# Patient Record
Sex: MALE | Race: WHITE | Employment: OTHER | URBAN - METROPOLITAN AREA
[De-identification: names, ages, dates, MRNs, and addresses within clinical notes are randomized per-mention and may not be internally consistent; named-entity substitution may affect disease eponyms.]

---

## 2020-12-05 ENCOUNTER — APPOINTMENT (OUTPATIENT)
Dept: GENERAL RADIOLOGY | Age: 65
End: 2020-12-05
Payer: MEDICARE

## 2020-12-05 ENCOUNTER — HOSPITAL ENCOUNTER (EMERGENCY)
Age: 65
Discharge: HOME | End: 2020-12-05
Payer: MEDICARE

## 2020-12-05 VITALS
DIASTOLIC BLOOD PRESSURE: 70 MMHG | TEMPERATURE: 98.1 F | OXYGEN SATURATION: 97 % | HEART RATE: 82 BPM | WEIGHT: 180 LBS | SYSTOLIC BLOOD PRESSURE: 112 MMHG | RESPIRATION RATE: 18 BRPM

## 2020-12-05 LAB
A/G RATIO: 1.3 (ref 1.1–2.2)
ALBUMIN SERPL-MCNC: 4.2 G/DL (ref 3.4–5)
ALP BLD-CCNC: 71 U/L (ref 40–129)
ALT SERPL-CCNC: 8 U/L (ref 10–40)
ANION GAP SERPL CALCULATED.3IONS-SCNC: 11 MMOL/L (ref 3–16)
AST SERPL-CCNC: 13 U/L (ref 15–37)
BASOPHILS ABSOLUTE: 0 K/UL (ref 0–0.2)
BASOPHILS RELATIVE PERCENT: 0.3 %
BILIRUB SERPL-MCNC: 0.6 MG/DL (ref 0–1)
BUN BLDV-MCNC: 23 MG/DL (ref 7–20)
CALCIUM SERPL-MCNC: 9.6 MG/DL (ref 8.3–10.6)
CHLORIDE BLD-SCNC: 101 MMOL/L (ref 99–110)
CO2: 28 MMOL/L (ref 21–32)
CREAT SERPL-MCNC: 1.9 MG/DL (ref 0.8–1.3)
EOSINOPHILS ABSOLUTE: 0 K/UL (ref 0–0.6)
EOSINOPHILS RELATIVE PERCENT: 0 %
GFR AFRICAN AMERICAN: 43
GFR NON-AFRICAN AMERICAN: 36
GLOBULIN: 3.2 G/DL
GLUCOSE BLD-MCNC: 121 MG/DL (ref 70–99)
HCT VFR BLD CALC: 44.7 % (ref 40.5–52.5)
HEMOGLOBIN: 15 G/DL (ref 13.5–17.5)
LACTIC ACID: 1.1 MMOL/L (ref 0.4–2)
LYMPHOCYTES ABSOLUTE: 0.9 K/UL (ref 1–5.1)
LYMPHOCYTES RELATIVE PERCENT: 11.7 %
MCH RBC QN AUTO: 29.2 PG (ref 26–34)
MCHC RBC AUTO-ENTMCNC: 33.5 G/DL (ref 31–36)
MCV RBC AUTO: 87.1 FL (ref 80–100)
MONOCYTES ABSOLUTE: 1.2 K/UL (ref 0–1.3)
MONOCYTES RELATIVE PERCENT: 16.1 %
NEUTROPHILS ABSOLUTE: 5.5 K/UL (ref 1.7–7.7)
NEUTROPHILS RELATIVE PERCENT: 71.9 %
PDW BLD-RTO: 13.6 % (ref 12.4–15.4)
PLATELET # BLD: 203 K/UL (ref 135–450)
PMV BLD AUTO: 8.8 FL (ref 5–10.5)
POTASSIUM SERPL-SCNC: 4 MMOL/L (ref 3.5–5.1)
RBC # BLD: 5.13 M/UL (ref 4.2–5.9)
SARS-COV-2, NAAT: DETECTED
SODIUM BLD-SCNC: 140 MMOL/L (ref 136–145)
SPECIMEN STATUS: NORMAL
TOTAL PROTEIN: 7.4 G/DL (ref 6.4–8.2)
WBC # BLD: 7.6 K/UL (ref 4–11)

## 2020-12-05 PROCEDURE — 71045 X-RAY EXAM CHEST 1 VIEW: CPT

## 2020-12-05 PROCEDURE — 80053 COMPREHEN METABOLIC PANEL: CPT

## 2020-12-05 PROCEDURE — 99284 EMERGENCY DEPT VISIT MOD MDM: CPT

## 2020-12-05 PROCEDURE — 85025 COMPLETE CBC W/AUTO DIFF WBC: CPT

## 2020-12-05 PROCEDURE — U0002 COVID-19 LAB TEST NON-CDC: HCPCS

## 2020-12-05 PROCEDURE — 83605 ASSAY OF LACTIC ACID: CPT

## 2020-12-05 RX ORDER — FLUTICASONE PROPIONATE 50 MCG
1 SPRAY, SUSPENSION (ML) NASAL DAILY
COMMUNITY

## 2020-12-05 RX ORDER — AZITHROMYCIN 250 MG/1
250 TABLET, FILM COATED ORAL DAILY
Status: ON HOLD | COMMUNITY
End: 2020-12-08

## 2020-12-05 RX ORDER — GABAPENTIN 600 MG/1
600 TABLET ORAL 2 TIMES DAILY
COMMUNITY

## 2020-12-05 RX ORDER — PREDNISONE 20 MG/1
20 TABLET ORAL DAILY
Status: ON HOLD | COMMUNITY
End: 2020-12-08

## 2020-12-05 NOTE — ED NOTES
Pt ambulated with pulse ox with this RN. Pt O2 sat remains 93% on room air and heart rate 90's- low 100s. Pt denies lightheadedness or dizziness or any increasing SOB.       Karyle Minder, RN  12/05/20 6291

## 2020-12-05 NOTE — ED NOTES
Patient's visitor provided with copy of Emergency Department Visitor Restrictions. Instructed to review while waiting to visit with patient. Visitor verbalized understanding.      Mike Chris RN  12/05/20 0126

## 2020-12-05 NOTE — ED NOTES
--Patient provided with discharge instructions and any prescriptions. --Instructions, dosing, and follow-up appointments reviewed with patient/family. No further questions or needs at this time. --Vital signs and patient stable upon discharge. --Patient ambulatory to [facility].        Mukul Chang RN  12/05/20 0579

## 2020-12-06 NOTE — ED PROVIDER NOTES
[address] [place] [address]  ED  EMERGENCY DEPARTMENT ENCOUNTER      This patient was not seen and evaluated by the attending physician. Pt Name: Nissa Aguilar  MRN: [id number]  [place] 1955  Date of evaluation: 12/5/2020  Provider: DEE Jeffery - [facility]  PCP: Priscilla Edwards DO      History provided by the patient. CHIEFCOMPLAINT:     Chief Complaint   Patient presents with    Fever     104 last night, every one in the house has COVID +,     Shortness of Breath       HISTORY OF PRESENT ILLNESS:      Nissa Aguilar is a 72 y.o. male who presents to [address] [place] [address]  ED with complaints of fever shortness of breath. Patient states that he had intermittent shortness of breath, states that he had a fever last night, states that everybody that he lives with has tested positive for Covid. He denies any pain, states that he does not actually feel that bad but he did spike a fever and wants evaluated. Is here for further evaluation. LOCATION:-  QUALITY:-  SEVERITY:-  DURATION:-  MODIFYING FACTORS:-    Nursing Notes were reviewed     REVIEW OF SYSTEMS:     Review of Systems  All systems, a total of 10, are reviewed and negative except for those that were just noted in history present illness. PAST MEDICAL HISTORY:     Past Medical History:   Diagnosis Date    COPD (chronic obstructive pulmonary disease) ([facility] [address]          SURGICAL HISTORY:    History reviewed. No pertinent surgical history. CURRENT MEDICATIONS:       Discharge Medication List as of 12/5/2020  2:29 PM      CONTINUE these medications which have NOT CHANGED    Details   predniSONE (DELTASONE) 20 MG tablet Take 20 mg by mouth dailyHistorical Med      azithromycin (ZITHROMAX) 250 MG tablet Take 250 mg by mouth dailyHistorical Med      gabapentin (NEURONTIN) 600 MG tablet Take 600 mg by mouth 3 times daily. Historical Med      fluticasone (FLONASE) 50 MCG/ACT nasal spray 1 spray by Each Nostril route dailyHistorical Med               ALLERGIES:    Patient has no known allergies. FAMILY HISTORY:     History reviewed. No pertinent family history. SOCIAL HISTORY:     Social History     Socioeconomic History    Marital status:      Spouse name: None    Number of children: None    Years of education: None    Highest education level: None   Occupational History    None   Social Needs    Financial resource strain: None    Food insecurity     Worry: None     Inability: None    Transportation needs     Medical: None     Non-medical: None   Tobacco Use    Smoking status: Former Smoker   Substance and Sexual Activity    Alcohol use: Not Currently    Drug use: Never    Sexual activity: None   Lifestyle    Physical activity     Days per week: None     Minutes per session: None    Stress: None   Relationships    Social connections     Talks on phone: None     Gets together: None     Attends Restoration service: None     Active member of club or organization: None     Attends meetings of clubs or organizations: None     Relationship status: None    Intimate partner violence     Fear of current or ex partner: None     Emotionally abused: None     Physically abused: None     Forced sexual activity: None   Other Topics Concern    None   Social History Narrative    None       SCREENINGS:    [place] Coma Scale  Eye Opening: Spontaneous  Best Verbal Response: Oriented  Best Motor Response: Obeys commands  Magda Coma Scale Score: 15        PHYSICAL EXAM:       ED Triage Vitals   BP Temp Temp Source Pulse Resp SpO2 Height Weight   12/05/20 1237 12/05/20 1237 12/05/20 1237 12/05/20 1215 12/05/20 1237 12/05/20 1215 -- 12/05/20 1237   112/70 98.1 °F (36.7 °C) Oral 108 18 91 %  180 lb (81.6 kg)       Physical Exam    CONSTITUTIONAL: Awake and alert. Cooperative. Well-developed. Well-nourished.    Vitals:    12/05/20 1215 12/05/20 1237   BP:  112/70   Pulse: 108 82   Resp:  18   Temp:  98.1 °F (36.7 °C) TempSrc:  Oral   SpO2: 91% 97%   Weight:  180 lb (81.6 kg)     HENT: Normocephalic. Atraumatic. External ears normal, without discharge. TMs clear bilaterally. Nonasal discharge. Oropharynx clear, no erythema. Mucous membranes moist.  EYES: Conjunctiva non-injected, nolid abnormalities noted. No scleral icterus. PERRL. EOM's grossly intact. Anterior chambers clear. NECK: Supple. Normal ROM. No meningismus. No thyroid tenderness or swelling noted. CARDIOVASCULAR: RRR. No Murmer. No carotid bruits. PULMONARY/CHEST WALL: Effort normal. No tachypnea. Lungs clear to ausculation. ABDOMEN: Normal BS. Soft. Nondistended. No tenderness to palpation. No guarding. No hernias noted. No splenomegaly. Back: Spine is midline. No ecchymosis. No crepituson palpation. No obvious subluxation of vertebral column. No saddle anesthesia or evidence of cauda equina. /ANORECTAL: Not assessed  MUSKULOSKELETAL: Normal ROM. No acute deformities. No edema. No tenderness to palpate. SKIN: Warm and dry. NEUROLOGICAL:  GCS 15. CN II-XII grossly intact. Strength is 5/5 in all extremities and sensation is intact. PSYCHIATRIC: Normal affect, normal insight and judgement. Alert and oriented x 3.         DIAGNOSTIC RESULTS:     LABS:    Results for orders placed or performed during the hospital encounter of 12/05/20   CBC Auto Differential   Result Value Ref Range    WBC 7.6 4.0 - 11.0 K/uL    RBC 5.13 4.20 - 5.90 M/uL    Hemoglobin 15.0 13.5 - 17.5 g/dL    Hematocrit 44.7 40.5 - 52.5 %    MCV 87.1 80.0 - 100.0 fL    MCH 29.2 26.0 - 34.0 pg    MCHC 33.5 31.0 - 36.0 g/dL    RDW 13.6 12.4 - 15.4 %    Platelets 485 307 - 916 K/uL    MPV 8.8 5.0 - 10.5 fL    Neutrophils % 71.9 %    Lymphocytes % 11.7 %    Monocytes % 16.1 %    Eosinophils % 0.0 %    Basophils % 0.3 %    Neutrophils Absolute 5.5 1.7 - 7.7 K/uL    Lymphocytes Absolute 0.9 (L) 1.0 - 5.1 K/uL    Monocytes Absolute 1.2 0.0 - 1.3 K/uL    Eosinophils Absolute 0.0 0.0 - 0.6 K/uL Covid, rapid Covid then came back positive. No leukocytosis, stable renal dysfunction. Lactate was negative. Chest x-ray was equivocal with Covid. Patient was ambulated with no hypoxia, vital signs stable. He was discharged home in good condition. Patient verbalized understanding of plan to return for any worsening symptoms. Patient laboratory studies, radiographic imaging, and assessment were all discussed with the patient and/orpatient family. There was shared decision-making between myself as well as the patient and/or their surrogate and we are all in agreement with discharge home. There was an opportunity for questions and all questions were answered tothe best of my ability and to the satisfaction of the patient and/or patient family. FINAL IMPRESSION:      1.  COVID-19          DISPOSITION/PLAN:   DISPOSITION Decision To Discharge      PATIENT REFERRED TO:  DO Karely Franco [address] [place] [id number]  [phone]    Call   For follow up      DISCHARGE MEDICATIONS:  Discharge Medication List as of 12/5/2020  2:29 PM                     (Please note thatportions of this note were completed with a voice recognition program.  Efforts were made to edit the dictations, but occasionally words are mis-transcribed.)    DEE Taylor CNP-C (electronicallysigned)        DEE Taylor CNP  12/06/20 8832

## 2020-12-07 ENCOUNTER — CARE COORDINATION (OUTPATIENT)
Dept: CARE COORDINATION | Age: 65
End: 2020-12-07

## 2020-12-07 NOTE — CARE COORDINATION
Attempted outreach call for ED f/u and COVID-19 monitoring. Called the only # listed and the woman who answered stated that it was the wrong number. She states that's gotten a lot of calls \"over the years,\" but that no one lives there with that name. No future appointments. Debi Click MSN, RN  Ambulatory Care Manager  [phone]  [email or web address]

## 2020-12-08 ENCOUNTER — HOSPITAL ENCOUNTER (INPATIENT)
Age: 65
LOS: 4 days | Discharge: HOME | DRG: 177 | End: 2020-12-12
Attending: EMERGENCY MEDICINE | Admitting: INTERNAL MEDICINE
Payer: MEDICARE

## 2020-12-08 ENCOUNTER — APPOINTMENT (OUTPATIENT)
Dept: GENERAL RADIOLOGY | Age: 65
DRG: 177 | End: 2020-12-08
Payer: MEDICARE

## 2020-12-08 PROBLEM — J96.01 ACUTE RESPIRATORY FAILURE WITH HYPOXIA (HCC): Status: ACTIVE | Noted: 2020-12-08

## 2020-12-08 LAB
A/G RATIO: 1.1 (ref 1.1–2.2)
A/G RATIO: 1.1 (ref 1.1–2.2)
ALBUMIN SERPL-MCNC: 3.4 G/DL (ref 3.4–5)
ALBUMIN SERPL-MCNC: 3.7 G/DL (ref 3.4–5)
ALP BLD-CCNC: 58 U/L (ref 40–129)
ALP BLD-CCNC: 60 U/L (ref 40–129)
ALT SERPL-CCNC: 8 U/L (ref 10–40)
ALT SERPL-CCNC: 8 U/L (ref 10–40)
ANION GAP SERPL CALCULATED.3IONS-SCNC: 12 MMOL/L (ref 3–16)
ANION GAP SERPL CALCULATED.3IONS-SCNC: 12 MMOL/L (ref 3–16)
AST SERPL-CCNC: 16 U/L (ref 15–37)
AST SERPL-CCNC: 16 U/L (ref 15–37)
BASE EXCESS VENOUS: -4.4 MMOL/L (ref -3–3)
BASOPHILS ABSOLUTE: 0 K/UL (ref 0–0.2)
BASOPHILS ABSOLUTE: 0 K/UL (ref 0–0.2)
BASOPHILS RELATIVE PERCENT: 0.1 %
BASOPHILS RELATIVE PERCENT: 0.5 %
BILIRUB SERPL-MCNC: 0.3 MG/DL (ref 0–1)
BILIRUB SERPL-MCNC: 0.4 MG/DL (ref 0–1)
BUN BLDV-MCNC: 22 MG/DL (ref 7–20)
BUN BLDV-MCNC: 24 MG/DL (ref 7–20)
CALCIUM SERPL-MCNC: 8.1 MG/DL (ref 8.3–10.6)
CALCIUM SERPL-MCNC: 8.8 MG/DL (ref 8.3–10.6)
CARBOXYHEMOGLOBIN: 2.2 % (ref 0–1.5)
CHLORIDE BLD-SCNC: 100 MMOL/L (ref 99–110)
CHLORIDE BLD-SCNC: 101 MMOL/L (ref 99–110)
CO2: 23 MMOL/L (ref 21–32)
CO2: 24 MMOL/L (ref 21–32)
CREAT SERPL-MCNC: 1.3 MG/DL (ref 0.8–1.3)
CREAT SERPL-MCNC: 1.4 MG/DL (ref 0.8–1.3)
D DIMER: 244 NG/ML DDU (ref 0–229)
EOSINOPHILS ABSOLUTE: 0 K/UL (ref 0–0.6)
EOSINOPHILS ABSOLUTE: 0 K/UL (ref 0–0.6)
EOSINOPHILS RELATIVE PERCENT: 0 %
EOSINOPHILS RELATIVE PERCENT: 0.1 %
GFR AFRICAN AMERICAN: >60
GFR AFRICAN AMERICAN: >60
GFR NON-AFRICAN AMERICAN: 51
GFR NON-AFRICAN AMERICAN: 55
GLOBULIN: 3 G/DL
GLOBULIN: 3.4 G/DL
GLUCOSE BLD-MCNC: 179 MG/DL (ref 70–99)
GLUCOSE BLD-MCNC: 94 MG/DL (ref 70–99)
HCO3 VENOUS: 21.9 MMOL/L (ref 23–29)
HCT VFR BLD CALC: 39.6 % (ref 40.5–52.5)
HCT VFR BLD CALC: 42.7 % (ref 40.5–52.5)
HEMOGLOBIN: 13.4 G/DL (ref 13.5–17.5)
HEMOGLOBIN: 14.3 G/DL (ref 13.5–17.5)
LACTIC ACID: 1.3 MMOL/L (ref 0.4–2)
LYMPHOCYTES ABSOLUTE: 0.2 K/UL (ref 1–5.1)
LYMPHOCYTES ABSOLUTE: 0.6 K/UL (ref 1–5.1)
LYMPHOCYTES RELATIVE PERCENT: 13.3 %
LYMPHOCYTES RELATIVE PERCENT: 5.3 %
MAGNESIUM: 2.2 MG/DL (ref 1.8–2.4)
MCH RBC QN AUTO: 29.2 PG (ref 26–34)
MCH RBC QN AUTO: 29.5 PG (ref 26–34)
MCHC RBC AUTO-ENTMCNC: 33.5 G/DL (ref 31–36)
MCHC RBC AUTO-ENTMCNC: 33.8 G/DL (ref 31–36)
MCV RBC AUTO: 87.1 FL (ref 80–100)
MCV RBC AUTO: 87.3 FL (ref 80–100)
METHEMOGLOBIN VENOUS: 0.3 %
MONOCYTES ABSOLUTE: 0.1 K/UL (ref 0–1.3)
MONOCYTES ABSOLUTE: 0.5 K/UL (ref 0–1.3)
MONOCYTES RELATIVE PERCENT: 10.4 %
MONOCYTES RELATIVE PERCENT: 2.6 %
NEUTROPHILS ABSOLUTE: 3.5 K/UL (ref 1.7–7.7)
NEUTROPHILS ABSOLUTE: 3.5 K/UL (ref 1.7–7.7)
NEUTROPHILS RELATIVE PERCENT: 75.7 %
NEUTROPHILS RELATIVE PERCENT: 92 %
O2 CONTENT, VEN: 8 VOL %
O2 SAT, VEN: 34 %
O2 THERAPY: ABNORMAL
PCO2, VEN: 45 MMHG (ref 40–50)
PDW BLD-RTO: 13.7 % (ref 12.4–15.4)
PDW BLD-RTO: 13.7 % (ref 12.4–15.4)
PH VENOUS: 7.31 (ref 7.35–7.45)
PLATELET # BLD: 156 K/UL (ref 135–450)
PLATELET # BLD: 161 K/UL (ref 135–450)
PMV BLD AUTO: 8.7 FL (ref 5–10.5)
PMV BLD AUTO: 9 FL (ref 5–10.5)
PO2, VEN: 22.6 MMHG (ref 25–40)
POTASSIUM REFLEX MAGNESIUM: 4.3 MMOL/L (ref 3.5–5.1)
POTASSIUM SERPL-SCNC: 3.8 MMOL/L (ref 3.5–5.1)
PRO-BNP: 10 PG/ML (ref 0–124)
PROCALCITONIN: 0.4 NG/ML (ref 0–0.15)
PROCALCITONIN: 0.55 NG/ML (ref 0–0.15)
RBC # BLD: 4.54 M/UL (ref 4.2–5.9)
RBC # BLD: 4.9 M/UL (ref 4.2–5.9)
SODIUM BLD-SCNC: 135 MMOL/L (ref 136–145)
SODIUM BLD-SCNC: 137 MMOL/L (ref 136–145)
TCO2 CALC VENOUS: 23 MMOL/L
TOTAL PROTEIN: 6.4 G/DL (ref 6.4–8.2)
TOTAL PROTEIN: 7.1 G/DL (ref 6.4–8.2)
TROPONIN: <0.01 NG/ML
WBC # BLD: 3.8 K/UL (ref 4–11)
WBC # BLD: 4.6 K/UL (ref 4–11)

## 2020-12-08 PROCEDURE — 6360000002 HC RX W HCPCS: Performed by: PHYSICIAN ASSISTANT

## 2020-12-08 PROCEDURE — 83880 ASSAY OF NATRIURETIC PEPTIDE: CPT

## 2020-12-08 PROCEDURE — 83735 ASSAY OF MAGNESIUM: CPT

## 2020-12-08 PROCEDURE — 2700000000 HC OXYGEN THERAPY PER DAY

## 2020-12-08 PROCEDURE — 84484 ASSAY OF TROPONIN QUANT: CPT

## 2020-12-08 PROCEDURE — 85379 FIBRIN DEGRADATION QUANT: CPT

## 2020-12-08 PROCEDURE — 6370000000 HC RX 637 (ALT 250 FOR IP): Performed by: INTERNAL MEDICINE

## 2020-12-08 PROCEDURE — 84145 PROCALCITONIN (PCT): CPT

## 2020-12-08 PROCEDURE — 99285 EMERGENCY DEPT VISIT HI MDM: CPT

## 2020-12-08 PROCEDURE — 2580000003 HC RX 258: Performed by: PHYSICIAN ASSISTANT

## 2020-12-08 PROCEDURE — 85025 COMPLETE CBC W/AUTO DIFF WBC: CPT

## 2020-12-08 PROCEDURE — 87040 BLOOD CULTURE FOR BACTERIA: CPT

## 2020-12-08 PROCEDURE — 83605 ASSAY OF LACTIC ACID: CPT

## 2020-12-08 PROCEDURE — 2500000003 HC RX 250 WO HCPCS: Performed by: INTERNAL MEDICINE

## 2020-12-08 PROCEDURE — 36415 COLL VENOUS BLD VENIPUNCTURE: CPT

## 2020-12-08 PROCEDURE — 1200000000 HC SEMI PRIVATE

## 2020-12-08 PROCEDURE — 86900 BLOOD TYPING SEROLOGIC ABO: CPT

## 2020-12-08 PROCEDURE — 2580000003 HC RX 258: Performed by: INTERNAL MEDICINE

## 2020-12-08 PROCEDURE — XW033E5 INTRODUCTION OF REMDESIVIR ANTI-INFECTIVE INTO PERIPHERAL VEIN, PERCUTANEOUS APPROACH, NEW TECHNOLOGY GROUP 5: ICD-10-PCS | Performed by: INTERNAL MEDICINE

## 2020-12-08 PROCEDURE — 71045 X-RAY EXAM CHEST 1 VIEW: CPT

## 2020-12-08 PROCEDURE — 80053 COMPREHEN METABOLIC PANEL: CPT

## 2020-12-08 PROCEDURE — 93005 ELECTROCARDIOGRAM TRACING: CPT | Performed by: EMERGENCY MEDICINE

## 2020-12-08 PROCEDURE — 6370000000 HC RX 637 (ALT 250 FOR IP): Performed by: EMERGENCY MEDICINE

## 2020-12-08 PROCEDURE — 94761 N-INVAS EAR/PLS OXIMETRY MLT: CPT

## 2020-12-08 PROCEDURE — 82803 BLOOD GASES ANY COMBINATION: CPT

## 2020-12-08 PROCEDURE — 96374 THER/PROPH/DIAG INJ IV PUSH: CPT

## 2020-12-08 PROCEDURE — 99223 1ST HOSP IP/OBS HIGH 75: CPT | Performed by: INTERNAL MEDICINE

## 2020-12-08 PROCEDURE — 6360000002 HC RX W HCPCS: Performed by: EMERGENCY MEDICINE

## 2020-12-08 PROCEDURE — 86850 RBC ANTIBODY SCREEN: CPT

## 2020-12-08 PROCEDURE — 86901 BLOOD TYPING SEROLOGIC RH(D): CPT

## 2020-12-08 RX ORDER — ONDANSETRON 2 MG/ML
4 INJECTION INTRAMUSCULAR; INTRAVENOUS EVERY 6 HOURS PRN
Status: DISCONTINUED | OUTPATIENT
Start: 2020-12-08 | End: 2020-12-12 | Stop reason: HOSPADM

## 2020-12-08 RX ORDER — MONTELUKAST SODIUM 10 MG/1
10 TABLET ORAL NIGHTLY
Status: DISCONTINUED | OUTPATIENT
Start: 2020-12-08 | End: 2020-12-12 | Stop reason: HOSPADM

## 2020-12-08 RX ORDER — ALBUTEROL SULFATE 90 UG/1
2 AEROSOL, METERED RESPIRATORY (INHALATION) 4 TIMES DAILY
Status: DISCONTINUED | OUTPATIENT
Start: 2020-12-08 | End: 2020-12-08

## 2020-12-08 RX ORDER — ACETAMINOPHEN 325 MG/1
650 TABLET ORAL EVERY 6 HOURS PRN
Status: DISCONTINUED | OUTPATIENT
Start: 2020-12-08 | End: 2020-12-12 | Stop reason: HOSPADM

## 2020-12-08 RX ORDER — POTASSIUM CHLORIDE 20 MEQ/1
40 TABLET, EXTENDED RELEASE ORAL PRN
Status: DISCONTINUED | OUTPATIENT
Start: 2020-12-08 | End: 2020-12-12 | Stop reason: HOSPADM

## 2020-12-08 RX ORDER — SODIUM CHLORIDE 0.9 % (FLUSH) 0.9 %
10 SYRINGE (ML) INJECTION EVERY 12 HOURS SCHEDULED
Status: DISCONTINUED | OUTPATIENT
Start: 2020-12-08 | End: 2020-12-12 | Stop reason: HOSPADM

## 2020-12-08 RX ORDER — TAMSULOSIN HYDROCHLORIDE 0.4 MG/1
0.4 CAPSULE ORAL DAILY
COMMUNITY

## 2020-12-08 RX ORDER — PROMETHAZINE HYDROCHLORIDE 25 MG/1
12.5 TABLET ORAL EVERY 6 HOURS PRN
Status: DISCONTINUED | OUTPATIENT
Start: 2020-12-08 | End: 2020-12-12 | Stop reason: HOSPADM

## 2020-12-08 RX ORDER — 0.9 % SODIUM CHLORIDE 0.9 %
20 INTRAVENOUS SOLUTION INTRAVENOUS ONCE
Status: DISCONTINUED | OUTPATIENT
Start: 2020-12-08 | End: 2020-12-12 | Stop reason: HOSPADM

## 2020-12-08 RX ORDER — MONTELUKAST SODIUM 10 MG/1
10 TABLET ORAL NIGHTLY
COMMUNITY

## 2020-12-08 RX ORDER — ACETAMINOPHEN 650 MG/1
650 SUPPOSITORY RECTAL EVERY 6 HOURS PRN
Status: DISCONTINUED | OUTPATIENT
Start: 2020-12-08 | End: 2020-12-12 | Stop reason: HOSPADM

## 2020-12-08 RX ORDER — MAGNESIUM SULFATE 1 G/100ML
1 INJECTION INTRAVENOUS PRN
Status: DISCONTINUED | OUTPATIENT
Start: 2020-12-08 | End: 2020-12-12 | Stop reason: HOSPADM

## 2020-12-08 RX ORDER — POLYETHYLENE GLYCOL 3350 17 G/17G
17 POWDER, FOR SOLUTION ORAL DAILY PRN
Status: DISCONTINUED | OUTPATIENT
Start: 2020-12-08 | End: 2020-12-12 | Stop reason: HOSPADM

## 2020-12-08 RX ORDER — SODIUM CHLORIDE 0.9 % (FLUSH) 0.9 %
10 SYRINGE (ML) INJECTION PRN
Status: DISCONTINUED | OUTPATIENT
Start: 2020-12-08 | End: 2020-12-12 | Stop reason: HOSPADM

## 2020-12-08 RX ORDER — GABAPENTIN 300 MG/1
600 CAPSULE ORAL 2 TIMES DAILY
Status: DISCONTINUED | OUTPATIENT
Start: 2020-12-08 | End: 2020-12-12 | Stop reason: HOSPADM

## 2020-12-08 RX ORDER — FLUTICASONE PROPIONATE 50 MCG
1 SPRAY, SUSPENSION (ML) NASAL DAILY
Status: DISCONTINUED | OUTPATIENT
Start: 2020-12-09 | End: 2020-12-12 | Stop reason: HOSPADM

## 2020-12-08 RX ORDER — DEXAMETHASONE SODIUM PHOSPHATE 10 MG/ML
10 INJECTION, SOLUTION INTRAMUSCULAR; INTRAVENOUS ONCE
Status: COMPLETED | OUTPATIENT
Start: 2020-12-08 | End: 2020-12-08

## 2020-12-08 RX ORDER — ALBUTEROL SULFATE 90 UG/1
2 AEROSOL, METERED RESPIRATORY (INHALATION) EVERY 4 HOURS PRN
Status: DISCONTINUED | OUTPATIENT
Start: 2020-12-08 | End: 2020-12-12 | Stop reason: HOSPADM

## 2020-12-08 RX ORDER — IPRATROPIUM BROMIDE AND ALBUTEROL SULFATE 2.5; .5 MG/3ML; MG/3ML
1 SOLUTION RESPIRATORY (INHALATION) ONCE
Status: COMPLETED | OUTPATIENT
Start: 2020-12-08 | End: 2020-12-08

## 2020-12-08 RX ORDER — TAMSULOSIN HYDROCHLORIDE 0.4 MG/1
0.4 CAPSULE ORAL DAILY
Status: DISCONTINUED | OUTPATIENT
Start: 2020-12-08 | End: 2020-12-12 | Stop reason: HOSPADM

## 2020-12-08 RX ORDER — POTASSIUM CHLORIDE 7.45 MG/ML
10 INJECTION INTRAVENOUS PRN
Status: DISCONTINUED | OUTPATIENT
Start: 2020-12-08 | End: 2020-12-12 | Stop reason: HOSPADM

## 2020-12-08 RX ORDER — 0.9 % SODIUM CHLORIDE 0.9 %
30 INTRAVENOUS SOLUTION INTRAVENOUS PRN
Status: DISCONTINUED | OUTPATIENT
Start: 2020-12-08 | End: 2020-12-12 | Stop reason: HOSPADM

## 2020-12-08 RX ADMIN — DEXAMETHASONE SODIUM PHOSPHATE 10 MG: 10 INJECTION, SOLUTION INTRAMUSCULAR; INTRAVENOUS at 14:00

## 2020-12-08 RX ADMIN — REMDESIVIR 200 MG: 100 INJECTION, POWDER, LYOPHILIZED, FOR SOLUTION INTRAVENOUS at 21:51

## 2020-12-08 RX ADMIN — GABAPENTIN 600 MG: 300 CAPSULE ORAL at 20:27

## 2020-12-08 RX ADMIN — TAMSULOSIN HYDROCHLORIDE 0.4 MG: 0.4 CAPSULE ORAL at 20:27

## 2020-12-08 RX ADMIN — MONTELUKAST SODIUM 10 MG: 10 TABLET, COATED ORAL at 20:27

## 2020-12-08 RX ADMIN — IPRATROPIUM BROMIDE AND ALBUTEROL SULFATE 1 AMPULE: .5; 3 SOLUTION RESPIRATORY (INHALATION) at 13:59

## 2020-12-08 RX ADMIN — Medication 10 ML: at 20:28

## 2020-12-08 RX ADMIN — SODIUM CHLORIDE 30 ML: 9 INJECTION, SOLUTION INTRAVENOUS at 21:51

## 2020-12-08 RX ADMIN — ENOXAPARIN SODIUM 30 MG: 100 INJECTION SUBCUTANEOUS at 20:27

## 2020-12-08 ASSESSMENT — ENCOUNTER SYMPTOMS
BACK PAIN: 0
COUGH: 1
DIARRHEA: 0
VOMITING: 0
NAUSEA: 0
CHEST TIGHTNESS: 1
ABDOMINAL PAIN: 0
SHORTNESS OF BREATH: 1
COLOR CHANGE: 0

## 2020-12-08 ASSESSMENT — PAIN DESCRIPTION - DESCRIPTORS: DESCRIPTORS: ACHING

## 2020-12-08 ASSESSMENT — PAIN SCALES - GENERAL: PAINLEVEL_OUTOF10: 4

## 2020-12-08 ASSESSMENT — PAIN DESCRIPTION - LOCATION: LOCATION: HEAD

## 2020-12-08 NOTE — ED PROVIDER NOTES
EMERGENCY DEPARTMENT ENCOUNTER        Pt Name: Magalys Dozier  MRN: [id number]  [place] 1955  Date of evaluation: 12/8/2020  Provider: Florinda Zaldivar MD  PCP: Billy Whitlock DO      CHIEF COMPLAINT       Chief Complaint   Patient presents with    Concern For COVID-19    Shortness of Breath     copd no home 02 desat to 84 ambulating       HISTORY OFPRESENT ILLNESS   (Location/Symptom, Timing/Onset, Context/Setting, Quality, Duration, Modifying Factors,Severity)  Note limiting factors. Magalys Dozier is a 72 y.o. male presenting today due to concern for increasing shortness of breath with known Covid infection. Symptoms started 8 days ago. He denies any chest pain or pain with breathing. No leg swelling or history of blood clots. He has a mild headache. No unilateral numbness or weakness but he does feel weak all over. He does complain of fevers and chills. No abdominal pain or vomiting or diarrhea. He does not normally wear oxygen but was saturating at 84% on room air when he ambulated to the room. He does have a history of COPD but quit smoking in 2008. His main concern was the worsening shortness of breath so he came to the emergency department for further evaluation. He denies any falls or trauma. No syncope. REVIEW OF SYSTEMS    (2-9 systems for level 4, 10 or more for level 5)     Review of Systems   Constitutional: Positive for chills, fatigue and fever. HENT: Positive for congestion. Respiratory: Positive for cough, chest tightness and shortness of breath. Cardiovascular: Negative for chest pain. Gastrointestinal: Negative for abdominal pain, diarrhea, nausea and vomiting. Genitourinary: Negative for flank pain. Musculoskeletal: Negative for back pain, gait problem and neck pain. Skin: Negative for color change and wound. Neurological: Positive for weakness (generalized) and headaches (very mild).  Negative for dizziness, syncope and light-headedness. Psychiatric/Behavioral: Negative for confusion. Positives and Pertinent negatives as per HPI. PASTMEDICAL HISTORY     Past Medical History:   Diagnosis Date    COPD (chronic obstructive pulmonary disease) ([address]          SURGICAL HISTORY     History reviewed. No pertinent surgical history. CURRENT MEDICATIONS       Current Discharge Medication List      CONTINUE these medications which have NOT CHANGED    Details   montelukast (SINGULAIR) 10 MG tablet Take 10 mg by mouth nightly      tamsulosin (FLOMAX) 0.4 MG capsule Take 0.4 mg by mouth daily      gabapentin (NEURONTIN) 600 MG tablet Take 600 mg by mouth 2 times daily. fluticasone (FLONASE) 50 MCG/ACT nasal spray 1 spray by Each Nostril route daily             ALLERGIES     Patient has no known allergies. FAMILY HISTORY     History reviewed. No pertinent family history.        SOCIAL HISTORY       Social History     Socioeconomic History    Marital status:      Spouse name: None    Number of children: None    Years of education: None    Highest education level: None   Occupational History    None   Social Needs    Financial resource strain: None    Food insecurity     Worry: None     Inability: None    Transportation needs     Medical: None     Non-medical: None   Tobacco Use    Smoking status: Former Smoker     Packs/day: 2.00     Years: 30.00     Pack years: 60.00     Types: Cigarettes     Last attempt to quit: 2008     Years since quittin.9    Smokeless tobacco: Never Used   Substance and Sexual Activity    Alcohol use: Not Currently    Drug use: Never    Sexual activity: None   Lifestyle    Physical activity     Days per week: None     Minutes per session: None    Stress: None   Relationships    Social connections     Talks on phone: None     Gets together: None     Attends [employer] service: None     Active member of club or organization: None     Attends meetings of clubs or organizations: None     Relationship status: None    Intimate partner violence     Fear of current or ex partner: None     Emotionally abused: None     Physically abused: None     Forced sexual activity: None   Other Topics Concern    None   Social History Narrative    None       SCREENINGS    [place] Coma Scale  Eye Opening: Spontaneous  Best Verbal Response: Oriented  Best Motor Response: Obeys commands  Magda Coma Scale Score: 15           PHYSICAL EXAM    (up to 7 for level 4, 8 or more for level 5)     ED Triage Vitals   BP Temp Temp src Pulse Resp SpO2 Height Weight   -- -- -- -- -- -- -- --       Physical Exam  Vitals signs and nursing note reviewed. Constitutional:       General: He is awake. He is not in acute distress. Appearance: Normal appearance. He is well-developed, well-groomed and normal weight. He is not ill-appearing, toxic-appearing or diaphoretic. Interventions: He is not intubated. HENT:      Head: Normocephalic and atraumatic. Right Ear: External ear normal.      Left Ear: External ear normal.   Eyes:      General:         Right eye: No discharge. Left eye: No discharge. Neck:      Musculoskeletal: Full passive range of motion without pain, normal range of motion and neck supple. Normal range of motion. No edema, erythema, neck rigidity, crepitus, injury, pain with movement, torticollis, spinous process tenderness or muscular tenderness. Trachea: Trachea and phonation normal. No tracheal deviation. Cardiovascular:      Rate and Rhythm: Normal rate and regular rhythm. Pulses: Normal pulses. Pulmonary:      Effort: Pulmonary effort is normal. No tachypnea, bradypnea, accessory muscle usage, prolonged expiration, respiratory distress or retractions. He is not intubated. Breath sounds: Normal breath sounds and air entry. No stridor, decreased air movement or transmitted upper airway sounds. No decreased breath sounds, wheezing, rhonchi or rales. Chest:      Chest wall: No tenderness. Abdominal:      General: Abdomen is flat. Bowel sounds are normal. There is no distension. Palpations: Abdomen is soft. Tenderness: There is no abdominal tenderness. There is no guarding or rebound. Negative signs include Weston's sign and McBurney's sign. Musculoskeletal: Normal range of motion. General: No swelling, tenderness, deformity or signs of injury. Right lower leg: No edema. Left lower leg: No edema. Skin:     General: Skin is warm and dry. Coloration: Skin is not jaundiced or pale. Findings: No bruising, erythema, lesion or rash. Neurological:      General: No focal deficit present. Mental Status: He is alert and oriented to person, place, and time. Mental status is at baseline. GCS: GCS eye subscore is 4. GCS verbal subscore is 5. GCS motor subscore is 6. Sensory: Sensation is intact. No sensory deficit. Motor: Motor function is intact. No weakness, tremor, atrophy, abnormal muscle tone or seizure activity. Gait: Gait is intact. Gait normal.   Psychiatric:         Attention and Perception: Attention normal.         Mood and Affect: Mood and affect normal.         Speech: Speech normal. Speech is not slurred. Behavior: Behavior normal. Behavior is cooperative. Cognition and Memory: Cognition normal.             DIAGNOSTIC RESULTS   :    Labs Reviewed   CBC WITH AUTO DIFFERENTIAL - Abnormal; Notable for the following components:       Result Value    Lymphocytes Absolute 0.6 (*)     All other components within normal limits    Narrative:     Performed at:  [facility]. [facility]  [address] [address].  [facility], [address] [place] [address]   Phone [phone]   COMPREHENSIVE METABOLIC PANEL - Abnormal; Notable for the following components:    BUN 24 (*)     CREATININE 1.4 (*)     GFR Non- 51 (*)     ALT 8 (*)     All other components within normal limits    Narrative:     Performed at:  [facility]. [facility]  [address] [place] [address]. [facility] Tagged    Phone [phone]   D-DIMER, QUANTITATIVE - Abnormal; Notable for the following components:    D-Dimer, Quant 244 (*)     All other components within normal limits    Narrative:     Performed at:  [facility]. [facility]  [address] [place], [place] [address]. [facility] Tagged    Phone [phone]   BLOOD GAS, VENOUS - Abnormal; Notable for the following components:    pH, Harvey 7.306 (*)     pO2, Harvey 22.6 (*)     HCO3, Venous 21.9 (*)     Base Excess, Harvey -4.4 (*)     Carboxyhemoglobin 2.2 (*)     All other components within normal limits    Narrative:     Performed at:  [facility]. [facility]  [address] [place] [address].  [facility] Tagged    Phone [phone]   COMPREHENSIVE METABOLIC PANEL W/ REFLEX TO MG FOR LOW K - Abnormal; Notable for the following components:    Sodium 135 (*)     Glucose 179 (*)     BUN 22 (*)     GFR Non-African American 55 (*)     Calcium 8.1 (*)     ALT 8 (*)     All other components within normal limits    Narrative:     Performed at:  [facility],  [place], [employer]   Phone [phone]   CBC WITH AUTO DIFFERENTIAL - Abnormal; Notable for the following components:    WBC 3.8 (*)     Hemoglobin 13.4 (*)     Hematocrit 39.6 (*)     Lymphocytes Absolute 0.2 (*)     All other components within normal limits    Narrative:     Performed at:  [facility]) - [facility],  [place], [employer]   Phone [phone]   PROCALCITONIN - Abnormal; Notable for the following components:    Procalcitonin 0.40 (*)     All other components within normal limits    Narrative:     Performed at:  [facility],  [place], [employer]   Phone [phone]   CULTURE, BLOOD 1 CULTURE, BLOOD 2   TROPONIN    Narrative:     Performed at:  [facility]. [facility]  [address] [place] [address],  [facility] [address] [place], [facility]    Phone [phone]   LACTIC ACID, PLASMA    Narrative:     Performed at:  [facility]. [facility]  [facility],  [facility] [address] [place], [facility] [address]   Phone [phone]    Narrative:     Performed at:  [facility]. [facility]  [address] [place] [address],  [facility] [address] [place] [facility]    Phone [phone]   MAGNESIUM    Narrative:     Performed at:  [facility]. [facility]  [address] [place] [address],  [facility] [address] [place] [address]   Phone [phone]   PROCALCITONIN   COMPREHENSIVE METABOLIC PANEL W/ REFLEX TO MG FOR LOW K   CBC WITH AUTO DIFFERENTIAL   PREPARE COVID-19 CONVALESCENT PLASMA   TYPE AND SCREEN     D-dimer was obtained for Covid evaluation and not due to concern for pulmonary embolism. All other labs were within normal range or not returned asof this dictation. EKG: All EKG's are interpreted by the Emergency Department Physician who either signs or Co-signs this chart in the absence of a cardiologist.    The Ekg interpreted by me shows  normal sinus rhythm with a rate of 87  Axis is   Normal  QTc is  normal  Intervals and Durations are unremarkable. ST Segments: nonspecific changes  No significant change from prior EKG dated - no old EKG  No STEMI, some baseline artifact noted but no obvious concerning findings at this time           RADIOLOGY:   Non-plain film images such as CT, Ultrasound and MRI are read by the radiologist. Malen Rocker images are visualized and preliminarily interpreted by the  ED Provider with the belowfindings:        Interpretation per the Radiologist below, if available at the time of this note:    XR CHEST PORTABLE   Final Result   Increasing bibasilar pneumonia.                PROCEDURES   Unless otherwise noted below, none     Procedures    CRITICAL CARE TIME   Time: 35 minutes  Includes examination, speaking with consultants, lab interpretation, charting, treating for acute respiratory failure with new oxygen requirement  Excludes separate billable procedures. Patient at risk for serious decompensation if not treated for this life-threatening condition. CONSULTS: Spoke with nurse practitioner Melvern Essex at [phone] for admission at [facility].   IP CONSULT TO HOSPITALIST  IP CONSULT TO PULMONOLOGY  IP CONSULT TO PHARMACY    EMERGENCY DEPARTMENT COURSE and DIFFERENTIAL DIAGNOSIS/MDM:   Vitals:    Vitals:    12/08/20 1738 12/08/20 1748 12/08/20 1931 12/08/20 2000   BP: 133/79  (!) 141/78    Pulse: 80  97    Resp: 19  18 16   Temp: 98.6 °F (37 °C)  99.5 °F (37.5 °C)    TempSrc: Axillary  Oral    SpO2:  94% 96% 95%   Weight:       Height:           Patient was given the following medications:  Medications   fluticasone (FLONASE) 50 MCG/ACT nasal spray 1 spray (has no administration in time range)   acetaminophen (TYLENOL) tablet 650 mg (has no administration in time range)     Or   acetaminophen (TYLENOL) suppository 650 mg (has no administration in time range)   enoxaparin (LOVENOX) injection 30 mg (30 mg Subcutaneous Given 12/8/20 2027)   sodium chloride flush 0.9 % injection 10 mL (10 mLs Intravenous Given 12/8/20 2028)   sodium chloride flush 0.9 % injection 10 mL (has no administration in time range)   potassium chloride (KLOR-CON M) extended release tablet 40 mEq (has no administration in time range)     Or   potassium bicarb-citric acid (EFFER-K) effervescent tablet 40 mEq (has no administration in time range)     Or   potassium chloride 10 mEq/100 mL IVPB (Peripheral Line) (has no administration in time range)   magnesium sulfate 1 g in dextrose 5% 100 mL IVPB (has no administration in time range)   polyethylene glycol (GLYCOLAX) packet 17 g (has no administration in time range)   promethazine (PHENERGAN) tablet 12.5 mg (has no administration in time range)     Or   ondansetron (ZOFRAN) injection 4 mg (has no administration in time range)   dexamethasone (DECADRON) tablet 6 mg (has no administration in time range)   gabapentin (NEURONTIN) capsule 600 mg (600 mg Oral Given 12/8/20 2027)   montelukast (SINGULAIR) tablet 10 mg (10 mg Oral Given 12/8/20 2027)   tamsulosin (FLOMAX) capsule 0.4 mg (0.4 mg Oral Given 12/8/20 2027)   0.9 % sodium chloride bolus (has no administration in time range)   remdesivir 200 mg in sodium chloride 0.9 % 250 mL IVPB (has no administration in time range)     Followed by   remdesivir 100 mg in sodium chloride 0.9 % 250 mL IVPB (has no administration in time range)   0.9 % sodium chloride bolus (has no administration in time range)   albuterol sulfate  (90 Base) MCG/ACT inhaler 2 puff (has no administration in time range)   ipratropium-albuterol (DUONEB) nebulizer solution 1 ampule (1 ampule Inhalation Given 12/8/20 1359)   dexamethasone (PF) (DECADRON) injection 10 mg (10 mg Intravenous Given 12/8/20 1400)     Patient was evaluated due to concern for increasing shortness of breath over the last 8 days with known Covid infection from testing 3 days ago at [facility]. He does not normally wear oxygen and does have a new oxygen requirement. He is comfortable in the room and in no acute distress when I saw him. He denies any pain with breathing and at this time I have low suspicion for pulmonary embolism. He will need further evaluation in the hospital due to concern for acute respiratory failure with hypoxia. He is stable for the floor with telemetry. Troponin was negative and story not suggestive of acute coronary syndrome. The patient tolerated their visit well. The patient and / or the family were informed of the results of any tests, a time was given to answer questions. FINAL IMPRESSION      1. Acute respiratory failure with hypoxia ([address]    2.

## 2020-12-08 NOTE — PROGRESS NOTES
Attempted to call for report at [place]. [facility]. Unable to hold any longer. Please call Nolvia Severinos to give report.

## 2020-12-08 NOTE — ED NOTES
Attempted to call report to [facility]. Unable to hold any longer after 8 minutes. Please call Jose Bridges for report asap.   [address] [facility]     Eder Epperson RN  12/08/20 4602

## 2020-12-08 NOTE — ED NOTES
Alert no distress. Resp easy on 1 l nc sats 93-95% Denies pain. Declines food or drink. Awaiting transport to [facility] for admission.        Luigi Hernandez RN  12/08/20 9561

## 2020-12-09 LAB
A/G RATIO: 1.1 (ref 1.1–2.2)
ABO/RH: NORMAL
ALBUMIN SERPL-MCNC: 3.5 G/DL (ref 3.4–5)
ALP BLD-CCNC: 56 U/L (ref 40–129)
ALT SERPL-CCNC: 9 U/L (ref 10–40)
ANION GAP SERPL CALCULATED.3IONS-SCNC: 12 MMOL/L (ref 3–16)
ANTIBODY SCREEN: NORMAL
AST SERPL-CCNC: 17 U/L (ref 15–37)
BASOPHILS ABSOLUTE: 0 K/UL (ref 0–0.2)
BASOPHILS RELATIVE PERCENT: 0.2 %
BILIRUB SERPL-MCNC: <0.2 MG/DL (ref 0–1)
BLOOD BANK DISPENSE STATUS: NORMAL
BLOOD BANK PRODUCT CODE: NORMAL
BPU ID: NORMAL
BUN BLDV-MCNC: 21 MG/DL (ref 7–20)
CALCIUM SERPL-MCNC: 8.7 MG/DL (ref 8.3–10.6)
CHLORIDE BLD-SCNC: 105 MMOL/L (ref 99–110)
CO2: 23 MMOL/L (ref 21–32)
CREAT SERPL-MCNC: 1.2 MG/DL (ref 0.8–1.3)
DESCRIPTION BLOOD BANK: NORMAL
EKG ATRIAL RATE: 87 BPM
EKG DIAGNOSIS: NORMAL
EKG P AXIS: 72 DEGREES
EKG P-R INTERVAL: 122 MS
EKG Q-T INTERVAL: 344 MS
EKG QRS DURATION: 86 MS
EKG QTC CALCULATION (BAZETT): 413 MS
EKG R AXIS: 61 DEGREES
EKG T AXIS: 67 DEGREES
EKG VENTRICULAR RATE: 87 BPM
EOSINOPHILS ABSOLUTE: 0 K/UL (ref 0–0.6)
EOSINOPHILS RELATIVE PERCENT: 0 %
GFR AFRICAN AMERICAN: >60
GFR NON-AFRICAN AMERICAN: >60
GLOBULIN: 3.1 G/DL
GLUCOSE BLD-MCNC: 120 MG/DL (ref 70–99)
HCT VFR BLD CALC: 39.6 % (ref 40.5–52.5)
HEMOGLOBIN: 13 G/DL (ref 13.5–17.5)
LYMPHOCYTES ABSOLUTE: 0.3 K/UL (ref 1–5.1)
LYMPHOCYTES RELATIVE PERCENT: 10.5 %
MCH RBC QN AUTO: 29.1 PG (ref 26–34)
MCHC RBC AUTO-ENTMCNC: 32.9 G/DL (ref 31–36)
MCV RBC AUTO: 88.4 FL (ref 80–100)
MONOCYTES ABSOLUTE: 0.3 K/UL (ref 0–1.3)
MONOCYTES RELATIVE PERCENT: 9.4 %
NEUTROPHILS ABSOLUTE: 2.6 K/UL (ref 1.7–7.7)
NEUTROPHILS RELATIVE PERCENT: 79.9 %
PDW BLD-RTO: 13.7 % (ref 12.4–15.4)
PLATELET # BLD: 158 K/UL (ref 135–450)
PMV BLD AUTO: 8.8 FL (ref 5–10.5)
POTASSIUM REFLEX MAGNESIUM: 5.3 MMOL/L (ref 3.5–5.1)
RBC # BLD: 4.48 M/UL (ref 4.2–5.9)
SODIUM BLD-SCNC: 140 MMOL/L (ref 136–145)
TOTAL PROTEIN: 6.6 G/DL (ref 6.4–8.2)
WBC # BLD: 3.3 K/UL (ref 4–11)

## 2020-12-09 PROCEDURE — 80053 COMPREHEN METABOLIC PANEL: CPT

## 2020-12-09 PROCEDURE — 2500000003 HC RX 250 WO HCPCS: Performed by: INTERNAL MEDICINE

## 2020-12-09 PROCEDURE — 94640 AIRWAY INHALATION TREATMENT: CPT

## 2020-12-09 PROCEDURE — 6360000002 HC RX W HCPCS: Performed by: PHYSICIAN ASSISTANT

## 2020-12-09 PROCEDURE — 1200000000 HC SEMI PRIVATE

## 2020-12-09 PROCEDURE — 36430 TRANSFUSION BLD/BLD COMPNT: CPT

## 2020-12-09 PROCEDURE — 6370000000 HC RX 637 (ALT 250 FOR IP): Performed by: INTERNAL MEDICINE

## 2020-12-09 PROCEDURE — XW13325 TRANSFUSION OF CONVALESCENT PLASMA (NONAUTOLOGOUS) INTO PERIPHERAL VEIN, PERCUTANEOUS APPROACH, NEW TECHNOLOGY GROUP 5: ICD-10-PCS | Performed by: INTERNAL MEDICINE

## 2020-12-09 PROCEDURE — 2580000003 HC RX 258: Performed by: PHYSICIAN ASSISTANT

## 2020-12-09 PROCEDURE — 2580000003 HC RX 258: Performed by: INTERNAL MEDICINE

## 2020-12-09 PROCEDURE — 36415 COLL VENOUS BLD VENIPUNCTURE: CPT

## 2020-12-09 PROCEDURE — 94761 N-INVAS EAR/PLS OXIMETRY MLT: CPT

## 2020-12-09 PROCEDURE — 85025 COMPLETE CBC W/AUTO DIFF WBC: CPT

## 2020-12-09 PROCEDURE — 6370000000 HC RX 637 (ALT 250 FOR IP): Performed by: PHYSICIAN ASSISTANT

## 2020-12-09 PROCEDURE — 93010 ELECTROCARDIOGRAM REPORT: CPT | Performed by: INTERNAL MEDICINE

## 2020-12-09 PROCEDURE — 2700000000 HC OXYGEN THERAPY PER DAY

## 2020-12-09 PROCEDURE — 99233 SBSQ HOSP IP/OBS HIGH 50: CPT | Performed by: INTERNAL MEDICINE

## 2020-12-09 PROCEDURE — 99221 1ST HOSP IP/OBS SF/LOW 40: CPT | Performed by: NURSE PRACTITIONER

## 2020-12-09 RX ORDER — ALBUTEROL SULFATE 90 UG/1
2 AEROSOL, METERED RESPIRATORY (INHALATION) EVERY 6 HOURS PRN
Status: DISCONTINUED | OUTPATIENT
Start: 2020-12-09 | End: 2020-12-12 | Stop reason: HOSPADM

## 2020-12-09 RX ADMIN — MONTELUKAST SODIUM 10 MG: 10 TABLET, COATED ORAL at 20:29

## 2020-12-09 RX ADMIN — ENOXAPARIN SODIUM 30 MG: 100 INJECTION SUBCUTANEOUS at 11:17

## 2020-12-09 RX ADMIN — GABAPENTIN 600 MG: 300 CAPSULE ORAL at 20:29

## 2020-12-09 RX ADMIN — REMDESIVIR 100 MG: 100 INJECTION, POWDER, LYOPHILIZED, FOR SOLUTION INTRAVENOUS at 20:51

## 2020-12-09 RX ADMIN — FLUTICASONE PROPIONATE 1 SPRAY: 50 SPRAY, METERED NASAL at 11:18

## 2020-12-09 RX ADMIN — ENOXAPARIN SODIUM 30 MG: 100 INJECTION SUBCUTANEOUS at 20:29

## 2020-12-09 RX ADMIN — Medication 2 PUFF: at 21:11

## 2020-12-09 RX ADMIN — ACETAMINOPHEN 650 MG: 325 TABLET ORAL at 01:20

## 2020-12-09 RX ADMIN — GABAPENTIN 600 MG: 300 CAPSULE ORAL at 11:17

## 2020-12-09 RX ADMIN — Medication 10 ML: at 20:28

## 2020-12-09 RX ADMIN — TAMSULOSIN HYDROCHLORIDE 0.4 MG: 0.4 CAPSULE ORAL at 11:17

## 2020-12-09 RX ADMIN — DEXAMETHASONE 6 MG: 4 TABLET ORAL at 11:17

## 2020-12-09 ASSESSMENT — PAIN SCALES - GENERAL
PAINLEVEL_OUTOF10: 0
PAINLEVEL_OUTOF10: 0

## 2020-12-09 NOTE — FLOWSHEET NOTE
12/09/20 0345   Vital Signs   Temp 98.4 °F (36.9 °C)   Temp Source Oral   Pulse 70   Heart Rate Source Monitor   Resp 17   /73   BP Location Right upper arm   Level of Consciousness Alert (0)   MEWS Score 1   Oxygen Therapy   SpO2 95 %   O2 Device Nasal cannula   O2 Flow Rate (L/min) 3 L/min   plasma complete and flushed through line. Pt tolerated with no signs of reaction.

## 2020-12-09 NOTE — PROGRESS NOTES
Convalescent plasma started. Will remain at bedside for 15 minutes while infusion starts. Discussed side effects to watch for reaction.

## 2020-12-09 NOTE — CONSULTS
Remdesivir protocol entered per consult request:  Please give 200mg IVPB x1 tonight followed by 100mg IVPB Q24hrs x4 additional doses.    Dhara Bravo PharmD 12/8/2020 8:28 PM

## 2020-12-09 NOTE — PROGRESS NOTES
Spoke with Dr. Almanza Alert regarding temp 101.3 prior to start of convalescent plasma. Per MD, ok to go ahead and given plasma and give pt tylenol as well.  Obdulio Quintero

## 2020-12-09 NOTE — PROGRESS NOTES
Pulmonary Progress Note    CC: Shortness of breath, hypoxemia, COVID-19 pneumonia    Subjective: Short of breath with wheezing    IV line: Peripheral      Intake/Output Summary (Last 24 hours) at 12/9/2020 2031  Last data filed at 12/9/2020 1312  Gross per 24 hour   Intake 240 ml   Output 800 ml   Net -560 ml       Exam:   /74   Pulse 75   Temp 97.2 °F (36.2 °C) (Oral)   Resp 18   Ht 5' 8\" (1.727 m)   Wt 160 lb (72.6 kg)   SpO2 97%   BMI 24.33 kg/m²  on 2 L  Gen: No distress. Alert. Eyes: PERRL. No sclera icterus. No conjunctival injection. ENT: No discharge. Pharynx clear. Neck: Trachea midline. Normal thyroid. Resp: No accessory muscle use. No crackles. + wheezes. No rhonchi. No dullness on percussion. Very poor air movement bilaterally  CV: Regular rate. Regular rhythm. No murmur or rub. No edema. GI: Non-tender. Non-distended. No masses. No organomegaly. Normal bowel sounds. No hernia. Skin: Warm and dry. No nodule on exposed extremities. No rash on exposed extremities. Lymph: No cervical LAD. No supraclavicular LAD. M/S: No cyanosis. No joint deformity. No clubbing. Neuro: Awake. Moves all extremities. CN grossly intact. Psych: Oriented x 3. No anxiety or agitation.      Scheduled Meds:   ipratropium  2 puff Inhalation 4x daily    fluticasone  1 spray Each Nostril Daily    enoxaparin  30 mg Subcutaneous BID    sodium chloride flush  10 mL Intravenous 2 times per day    dexamethasone  6 mg Oral Daily    gabapentin  600 mg Oral BID    montelukast  10 mg Oral Nightly    tamsulosin  0.4 mg Oral Daily    sodium chloride  20 mL Intravenous Once    remdesivir IVPB  100 mg Intravenous Q24H     Continuous Infusions:    PRN Meds:  albuterol sulfate HFA, acetaminophen **OR** acetaminophen, sodium chloride flush, potassium chloride **OR** potassium alternative oral replacement **OR** potassium chloride, magnesium sulfate, polyethylene glycol, promethazine **OR** ondansetron, sodium chloride, albuterol sulfate HFA    Labs:  CBC:   Recent Labs     12/08/20 1338 12/08/20 1958 12/09/20  0634   WBC 4.6 3.8* 3.3*   HGB 14.3 13.4* 13.0*   HCT 42.7 39.6* 39.6*   MCV 87.1 87.3 88.4    156 158     BMP:   Recent Labs     12/08/20 1338 12/08/20 1958 12/09/20  0634    135* 140   K 3.8 4.3 5.3*    100 105   CO2 24 23 23   BUN 24* 22* 21*   CREATININE 1.4* 1.3 1.2     LIVER PROFILE:   Recent Labs     12/08/20 1338 12/08/20 1958 12/09/20  0634   AST 16 16 17   ALT 8* 8* 9*   BILITOT 0.4 0.3 <0.2   ALKPHOS 60 58 56     PT/INR: No results for input(s): PROTIME, INR in the last 72 hours. APTT: No results for input(s): APTT in the last 72 hours. UA:No results for input(s): NITRITE, COLORU, PHUR, LABCAST, WBCUA, RBCUA, MUCUS, TRICHOMONAS, YEAST, BACTERIA, CLARITYU, SPECGRAV, LEUKOCYTESUR, UROBILINOGEN, BILIRUBINUR, BLOODU, GLUCOSEU, AMORPHOUS in the last 72 hours. Invalid input(s): KETONESU  No results for input(s): PHART, KEB9TCS, PO2ART in the last 72 hours.   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

## 2020-12-09 NOTE — CONSULTS
Musculoskeletal: Negative for arthralgias   Skin: Negative for rash  Neurological: Negative for syncope  Hematological: Negative for adenopathy  Psychiatric/Behavorial: Negative for anxiety    PHYSICAL EXAM:  Blood pressure 133/79, pulse 80, temperature 98.6 °F (37 °C), temperature source Axillary, resp. rate 19, height 5' 8\" (1.727 m), weight 160 lb (72.6 kg), SpO2 94 %.' on 3 L  Gen: No distress. Eyes: PERRL. No sclera icterus. No conjunctival injection. ENT: No discharge. Pharynx clear. Neck: Trachea midline. No obvious mass. Resp: No accessory muscle use. No crackles. ++ wheezes. No rhonchi. No dullness on percussion. CV: Regular rate. Regular rhythm. No murmur or rub. No edema. Peripheral pulses are 2+. Capillary refill is less than 3 seconds. GI: Non-tender. Non-distended. No hernia. Skin: Warm and dry. No nodule on exposed extremities. Lymph: No cervical LAD. No supraclavicular LAD. M/S: No cyanosis. No joint deformity. No clubbing. Neuro: Awake. Alert. Moves all four extremities. Psych: Oriented x 3. No anxiety. LABS:  CBC:   Recent Labs     12/08/20  1338   WBC 4.6   HGB 14.3   HCT 42.7   MCV 87.1        BMP:   Recent Labs     12/08/20  1338      K 3.8      CO2 24   BUN 24*   CREATININE 1.4*     LIVER PROFILE:   Recent Labs     12/08/20  1338   AST 16   ALT 8*   BILITOT 0.4   ALKPHOS 60     PT/INR: No results for input(s): PROTIME, INR in the last 72 hours. APTT: No results for input(s): APTT in the last 72 hours. UA:No results for input(s): NITRITE, COLORU, PHUR, LABCAST, WBCUA, RBCUA, MUCUS, TRICHOMONAS, YEAST, BACTERIA, CLARITYU, SPECGRAV, LEUKOCYTESUR, UROBILINOGEN, BILIRUBINUR, BLOODU, GLUCOSEU, AMORPHOUS in the last 72 hours. Invalid input(s): KETONESU  No results for input(s): PHART, CKR3ZIA, PO2ART in the last 72 hours.     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   I consented the patient to receive CCP after explaining the risk, potential benefits, meaning of emergency use authorization and absence of FDA approval.   Decadron D#1; 6 mg daily   Inhaled bronchodilators only as needed, MDI preferred  Low dose chest CT for lung cancer screening can be considered as an outpatient

## 2020-12-09 NOTE — FLOWSHEET NOTE
12/09/20 0151   Vital Signs   Temp 100.3 °F (37.9 °C)   Temp Source Oral   Pulse 83   Heart Rate Source Monitor   Resp 16   /82   BP Location Right upper arm   Level of Consciousness Alert (0)   MEWS Score 1   Oxygen Therapy   SpO2 95 %   O2 Device Nasal cannula   O2 Flow Rate (L/min) 3 L/min   plasma infusing. No signs or symptoms of reaction.

## 2020-12-09 NOTE — PROGRESS NOTES
PM assessment completed, see flow sheet. Pt is alert and oriented. Respirations are even & easy at rest. Bilateral wheezes noted. No complaints voiced. Pt denies needs at this time. SR up x 2, and bed in low position. Call light is within reach.

## 2020-12-09 NOTE — H&P
use.      Family History:   Positive as follows:    History reviewed. No pertinent family history. REVIEW OF SYSTEMS:       Constitutional: + fever, HA  Respiratory: + dyspnea, cough   Cardiovascular: Negative for chest pain   Gastrointestinal: Negative for vomiting, diarrhea   Genitourinary: Negative for hematuria   Musculoskeletal: Negative for arthralgias + weakness  Skin: Negative for rash   Neurological: Negative for syncope    Psychiatric/Behavorial: Negative for anxiety    PHYSICAL EXAM:    /78   Pulse 76   Temp 97 °F (36.1 °C) (Oral)   Resp 18   Ht 5' 8\" (1.727 m)   Wt 160 lb (72.6 kg)   SpO2 94%   BMI 24.33 kg/m²     Gen: No distress. Alert. Eyes: PERRL. No sclera icterus. No conjunctival injection. ENT: No discharge. Pharynx clear. Neck: Trachea midline. Resp: No accessory muscle use. No crackles. + wheezes. No rhonchi. CV: Regular rate. Regular rhythm. No murmur. No rub. No edema. GI: Non-tender. Non-distended. Normal bowel sounds. No hernia. Skin: Warm and dry. No nodule on exposed extremities. No rash on exposed extremities. M/S: No cyanosis. No joint deformity. No clubbing. Neuro: Awake. Grossly nonfocal    Psych: Oriented x 3. No anxiety or agitation.      CBC:   Recent Labs     12/08/20 1338 12/08/20 1958 12/09/20  0634   WBC 4.6 3.8* 3.3*   HGB 14.3 13.4* 13.0*   HCT 42.7 39.6* 39.6*   MCV 87.1 87.3 88.4    156 158     BMP:   Recent Labs     12/08/20 1338 12/08/20 1958 12/09/20  0634    135* 140   K 3.8 4.3 5.3*    100 105   CO2 24 23 23   BUN 24* 22* 21*   CREATININE 1.4* 1.3 1.2     LIVER PROFILE:   Recent Labs     12/08/20 1338 12/08/20 1958 12/09/20  0634   AST 16 16 17   ALT 8* 8* 9*   BILITOT 0.4 0.3 <0.2   ALKPHOS 60 62 56       CARDIAC ENZYMES  Recent Labs     12/08/20 1338   TROPONINI <0.01       CULTURES  Blood: pending    EKG:  I have reviewed the EKG with the following interpretation:   NSR    RADIOLOGY  XR CHEST PORTABLE Final Result   Increasing bibasilar pneumonia. Active Problems:    Acute respiratory failure with hypoxia (HCC)    Pneumonia due to COVID-19 virus  Resolved Problems:    * No resolved hospital problems. *        ASSESSMENT/PLAN:  Acute hypoxic respiratory failure  - due to COVID pneumonia  - patient admitted to med-surg on tele, continuous pulse ox  - on 3 L O2. Wean as tolerated. COVID-19 pneumonia  - pulmonology consulted. - 1 unit CCP, Remdesivir D#2, Decadron D#2  - Albuterol prn. COPD exacerbation  - Decadron, MDI as needed. VILLA  - Creatinine 1.4-->1.2  - Given IVFs. Monitor labs  - baseline unclear. Leukopenia   - most likely due to above  - Trend CBC    Single kidney  - states he was too small and his kidney stopped working. It was removed.       DVT Prophylaxis: Lovenox 30 mg BID  Diet: DIET GENERAL;  Code Status: Full Code    Plan of care discussed with Dr. Adin GARCIAP-C  12/9/2020

## 2020-12-09 NOTE — PROGRESS NOTES
RESPIRATORY THERAPY ASSESSMENT    Name:  Cony [facility]  Medical Record Number:  [id number]  Age: 72 y.o. Gender: male  : 1955  Today's Date:  2020  Room:  0214/0214-01    Assessment     Is the patient being admitted for a COPD or Asthma exacerbation? No   (If yes the patient will be seen every 4 hours for the first 24 hours and then reassessed)    Patient Admission Diagnosis      Allergies  No Known Allergies    Minimum Predicted Vital Capacity:               Actual Vital Capacity:                    Pulmonary History:COPD  Home Oxygen Therapy:  room air  Home Respiratory Therapy:Albuterol   Current Respiratory Therapy:  Albuterol           Respiratory Severity Index(RSI)   Patients with orders for inhalation medications, oxygen, or any therapeutic treatment modality will be placed on Respiratory Protocol. They will be assessed with the first treatment and at least every 72 hours thereafter. The following severity scale will be used to determine frequency of treatment intervention. Smoking History: Pulmonary Disease or Smoking History, Greater than 15 pack year = 2    Social History  Social History     Tobacco Use    Smoking status: Former Smoker     Packs/day: 2.00     Years: 30.00     Pack years: 60.00     Types: Cigarettes     Last attempt to quit: 2008     Years since quittin.9    Smokeless tobacco: Never Used   Substance Use Topics    Alcohol use: Not Currently    Drug use: Never       Recent Surgical History: None = 0  Past Surgical History  History reviewed. No pertinent surgical history.     Level of Consciousness: Alert, Oriented, and Cooperative = 0    Level of Activity: Walking unassisted = 0    Respiratory Pattern: Dyspnea with exertion;Irregular pattern;or RR less than 6 = 2    Breath Sounds: Diminshed bilaterally and/or crackles = 2    Sputum   ,  ,    Cough: Strong, spontaneous, non-productive = 0    Vital Signs   BP (!) 141/78   Pulse 97   Temp 99.5 °F (37.5 °C) (Oral)   Resp 16   Ht 5' 8\" (1.727 m)   Wt 160 lb (72.6 kg)   SpO2 95%   BMI 24.33 kg/m²   SPO2 (COPD values may differ): Greater than or equal to 92% on room air = 0    Peak Flow (asthma only): not applicable = 0    RSI: 5-6 = Q4hr PRN (every four hours as needed) for dyspnea        Plan       Goals: medication delivery, mobilize retained secretions, volume expansion and improve oxygenation    Patient/caregiver was educated on the proper method of use for Respiratory Care Devices:  Yes      Level of patient/caregiver understanding able to:   ? Verbalize understanding   ? Demonstrate understanding       ? Teach back        ? Needs reinforcement       ? No available caregiver               ? Other:     Response to education:  Very Good     Is patient being placed on Home Treatment Regimen? No     Does the patient have everything they need prior to discharge? NA     Comments: pt assessed & chart reviewed    Plan of Care: Albuterol 2 puff Q4 PRN    Electronically signed by Carlos Cooper RCP on 12/8/2020 at 8:23 PM    Respiratory Protocol Guidelines     1. Assessment and treatment by Respiratory Therapy will be initiated for medication and therapeutic interventions upon initiation of aerosolized medication. 2. Physician will be contacted for respiratory rate (RR) greater than 35 breaths per minute. Therapy will be held for heart rate (HR) greater than 140 beats per minute, pending direction from physician. 3. Bronchodilators will be administered via Metered Dose Inhaler (MDI) with spacer when the following criteria are met:  a. Alert and cooperative     b. HR < 140 bpm  c. RR < 30 bpm                d. Can demonstrate a 2-3 second inspiratory hold  4. Bronchodilators will be administered via Hand Held Nebulizer [facility]) to patients when ANY of the following criteria are met  a. Incognizant or uncooperative          b. Patients treated with HHN at Home        c.  Unable to demonstrate proper use of MDI with spacer     d. RR > 30 bpm   5. Bronchodilators will be delivered via Metered Dose Inhaler (MDI), HHN, Aerogen to intubated patients on mechanical ventilation. 6. Inhalation medication orders will be delivered and/or substituted as outlined below. Aerosolized Medications Ordering and Administration Guidelines:    1. All Medications will be ordered by a physician, and their frequency and/or modality will be adjusted as defined by the patients Respiratory Severity Index (RSI) score. 2. If the patient does not have documented COPD, consider discontinuing anticholinergics when RSI is less than 9.  3. If the bronchospasm worsens (increased RSI), then the bronchodilator frequency can be increased to a maximum of every 4 hours. If greater than every 4 hours is required, the physician will be contacted. 4. If the bronchospasm improves, the frequency of the bronchodilator can be decreased, based on the patient's RSI, but not less than home treatment regimen frequency. 5. Bronchodilator(s) will be discontinued if patient has a RSI less than 9 and has received no scheduled or as needed treatment for 72  Hrs. Patients Ordered on a Mucolytic Agent:    1. Must always be administered with a bronchodilator. 2. Discontinue if patient experiences worsened bronchospasm, or secretions have lessened to the point that the patient is able to clear them with a cough. Anti-inflammatory and Combination Medications:    1. If the patient lacks prior history of lung disease, is not using inhaled anti-inflammatory medication at home, and lacks wheezing by examination or by history for at least 24 hours, contact physician for possible discontinuation.

## 2020-12-10 LAB
A/G RATIO: 1.2 (ref 1.1–2.2)
ALBUMIN SERPL-MCNC: 3.5 G/DL (ref 3.4–5)
ALP BLD-CCNC: 58 U/L (ref 40–129)
ALT SERPL-CCNC: 13 U/L (ref 10–40)
ANION GAP SERPL CALCULATED.3IONS-SCNC: 11 MMOL/L (ref 3–16)
AST SERPL-CCNC: 25 U/L (ref 15–37)
BASOPHILS ABSOLUTE: 0 K/UL (ref 0–0.2)
BASOPHILS RELATIVE PERCENT: 0.1 %
BILIRUB SERPL-MCNC: 0.4 MG/DL (ref 0–1)
BUN BLDV-MCNC: 27 MG/DL (ref 7–20)
CALCIUM SERPL-MCNC: 8.3 MG/DL (ref 8.3–10.6)
CHLORIDE BLD-SCNC: 107 MMOL/L (ref 99–110)
CO2: 23 MMOL/L (ref 21–32)
CREAT SERPL-MCNC: 1.1 MG/DL (ref 0.8–1.3)
EOSINOPHILS ABSOLUTE: 0 K/UL (ref 0–0.6)
EOSINOPHILS RELATIVE PERCENT: 0 %
GFR AFRICAN AMERICAN: >60
GFR NON-AFRICAN AMERICAN: >60
GLOBULIN: 3 G/DL
GLUCOSE BLD-MCNC: 129 MG/DL (ref 70–99)
HCT VFR BLD CALC: 40.2 % (ref 40.5–52.5)
HEMOGLOBIN: 13.6 G/DL (ref 13.5–17.5)
LYMPHOCYTES ABSOLUTE: 0.5 K/UL (ref 1–5.1)
LYMPHOCYTES RELATIVE PERCENT: 7.1 %
MCH RBC QN AUTO: 29.2 PG (ref 26–34)
MCHC RBC AUTO-ENTMCNC: 33.9 G/DL (ref 31–36)
MCV RBC AUTO: 86.1 FL (ref 80–100)
MONOCYTES ABSOLUTE: 0.5 K/UL (ref 0–1.3)
MONOCYTES RELATIVE PERCENT: 8.1 %
NEUTROPHILS ABSOLUTE: 5.6 K/UL (ref 1.7–7.7)
NEUTROPHILS RELATIVE PERCENT: 84.7 %
PDW BLD-RTO: 13.6 % (ref 12.4–15.4)
PLATELET # BLD: 201 K/UL (ref 135–450)
PMV BLD AUTO: 9.2 FL (ref 5–10.5)
POTASSIUM REFLEX MAGNESIUM: 4.4 MMOL/L (ref 3.5–5.1)
RBC # BLD: 4.67 M/UL (ref 4.2–5.9)
SODIUM BLD-SCNC: 141 MMOL/L (ref 136–145)
TOTAL PROTEIN: 6.5 G/DL (ref 6.4–8.2)
WBC # BLD: 6.6 K/UL (ref 4–11)

## 2020-12-10 PROCEDURE — 2580000003 HC RX 258: Performed by: PHYSICIAN ASSISTANT

## 2020-12-10 PROCEDURE — 1200000000 HC SEMI PRIVATE

## 2020-12-10 PROCEDURE — 2580000003 HC RX 258: Performed by: INTERNAL MEDICINE

## 2020-12-10 PROCEDURE — 99233 SBSQ HOSP IP/OBS HIGH 50: CPT | Performed by: INTERNAL MEDICINE

## 2020-12-10 PROCEDURE — 2700000000 HC OXYGEN THERAPY PER DAY

## 2020-12-10 PROCEDURE — 94761 N-INVAS EAR/PLS OXIMETRY MLT: CPT

## 2020-12-10 PROCEDURE — 80053 COMPREHEN METABOLIC PANEL: CPT

## 2020-12-10 PROCEDURE — 6370000000 HC RX 637 (ALT 250 FOR IP): Performed by: INTERNAL MEDICINE

## 2020-12-10 PROCEDURE — 2500000003 HC RX 250 WO HCPCS: Performed by: INTERNAL MEDICINE

## 2020-12-10 PROCEDURE — 94640 AIRWAY INHALATION TREATMENT: CPT

## 2020-12-10 PROCEDURE — 36415 COLL VENOUS BLD VENIPUNCTURE: CPT

## 2020-12-10 PROCEDURE — 99232 SBSQ HOSP IP/OBS MODERATE 35: CPT | Performed by: INTERNAL MEDICINE

## 2020-12-10 PROCEDURE — 6360000002 HC RX W HCPCS: Performed by: PHYSICIAN ASSISTANT

## 2020-12-10 PROCEDURE — 85025 COMPLETE CBC W/AUTO DIFF WBC: CPT

## 2020-12-10 RX ORDER — ALBUTEROL SULFATE 90 UG/1
2 AEROSOL, METERED RESPIRATORY (INHALATION) 4 TIMES DAILY
Status: DISCONTINUED | OUTPATIENT
Start: 2020-12-10 | End: 2020-12-12 | Stop reason: HOSPADM

## 2020-12-10 RX ADMIN — GABAPENTIN 600 MG: 300 CAPSULE ORAL at 22:45

## 2020-12-10 RX ADMIN — Medication 2 PUFF: at 07:30

## 2020-12-10 RX ADMIN — REMDESIVIR 100 MG: 100 INJECTION, POWDER, LYOPHILIZED, FOR SOLUTION INTRAVENOUS at 22:44

## 2020-12-10 RX ADMIN — MONTELUKAST SODIUM 10 MG: 10 TABLET, COATED ORAL at 22:45

## 2020-12-10 RX ADMIN — Medication 2 PUFF: at 10:53

## 2020-12-10 RX ADMIN — DEXAMETHASONE 6 MG: 4 TABLET ORAL at 08:50

## 2020-12-10 RX ADMIN — GABAPENTIN 600 MG: 300 CAPSULE ORAL at 08:51

## 2020-12-10 RX ADMIN — TAMSULOSIN HYDROCHLORIDE 0.4 MG: 0.4 CAPSULE ORAL at 08:51

## 2020-12-10 RX ADMIN — ENOXAPARIN SODIUM 30 MG: 100 INJECTION SUBCUTANEOUS at 22:45

## 2020-12-10 RX ADMIN — ENOXAPARIN SODIUM 30 MG: 100 INJECTION SUBCUTANEOUS at 08:51

## 2020-12-10 RX ADMIN — Medication 2 PUFF: at 19:16

## 2020-12-10 RX ADMIN — Medication 2 PUFF: at 16:23

## 2020-12-10 RX ADMIN — Medication 10 ML: at 22:44

## 2020-12-10 NOTE — PROGRESS NOTES
[facility]off report and [facility]. Pt in stable condition. Call light within reach. Bed locked in lowest position. Denies further needs at this time.

## 2020-12-10 NOTE — PROGRESS NOTES
Progress Note    Admit Date:  12/8/2020    Patient admitted with COVID-19 infection and hypoxia      Subjective:  Mr. Bina Taylor is feeling better today . O2 requirement down from 3 L to 2 L. Objective:   /82   Pulse 90   Temp 96.8 °F (36 °C) (Oral)   Resp 18   Ht 5' 8\" (1.727 m)   Wt 160 lb (72.6 kg)   SpO2 95%   BMI 24.33 kg/m²       Intake/Output Summary (Last 24 hours) at 12/10/2020 1831  Last data filed at 12/10/2020 1355  Gross per 24 hour   Intake 480 ml   Output --   Net 480 ml         Physical Exam:  General:  Awake, alert, NAD  Skin:  Warm and dry  Neck:  JVD absent. Neck supple  Chest:    Diminished breath sounds no wheezes rales or rhonchi  Cardiovascular:  RRR ,S1S2 normal  Abdomen:  Soft, non tender, non distended, BS +  Extremities:  No edema. Intact peripheral pulses. Brisk cap refill, < 2 secs  Neuro: non focal      Medications:   Scheduled Meds:   ipratropium  2 puff Inhalation 4x daily    fluticasone  1 spray Each Nostril Daily    enoxaparin  30 mg Subcutaneous BID    sodium chloride flush  10 mL Intravenous 2 times per day    dexamethasone  6 mg Oral Daily    gabapentin  600 mg Oral BID    montelukast  10 mg Oral Nightly    tamsulosin  0.4 mg Oral Daily    sodium chloride  20 mL Intravenous Once    remdesivir IVPB  100 mg Intravenous Q24H       Continuous Infusions:      Data:  CBC:   Recent Labs     12/08/20 1958 12/09/20  0634 12/10/20  0647   WBC 3.8* 3.3* 6.6   RBC 4.54 4.48 4.67   HGB 13.4* 13.0* 13.6   HCT 39.6* 39.6* 40.2*   MCV 87.3 88.4 86.1   RDW 13.7 13.7 13.6    158 201     BMP:   Recent Labs     12/08/20 1958 12/09/20  0634 12/10/20  0647   * 140 141   K 4.3 5.3* 4.4    105 107   CO2 23 23 23   BUN 22* 21* 27*   CREATININE 1.3 1.2 1.1     BNP: No results for input(s): BNP in the last 72 hours. PT/INR: No results for input(s): PROTIME, INR in the last 72 hours. APTT: No results for input(s): APTT in the last 72 hours.   CARDIAC ENZYMES: Recent Labs     12/08/20  1338   TROPONINI <0.01     FASTING LIPID PANEL:No results found for: CHOL, HDL, TRIG  LIVER PROFILE:   Recent Labs     12/08/20  1958 12/09/20  0634 12/10/20  0647   AST 16 17 25   ALT 8* 9* 13   BILITOT 0.3 <0.2 0.4   ALKPHOS 58 56 58        Radiology  XR CHEST PORTABLE   Final Result   Increasing bibasilar pneumonia. Assessment:  Active Problems:    Acute respiratory failure with hypoxia (HCC)    Pneumonia due to COVID-19 virus    COPD exacerbation ([facility] [address]    COVID-19  Resolved Problems:    * No resolved hospital problems. *      Plan:  Acute hypoxic respiratory failure  - due to COVID pneumonia  - patient admitted to med-surg on tele, continuous pulse ox  - on 3 L O2. Wean as tolerated. O2 requirement down to 2 L now. Continue to monitor with pulse oximetry    COVID-19 pneumonia  - pulmonology consulted. - CXR - + ASD  -S/p transfusion of 1 unit CCP. -Continue remdesivir D#3, Decadron D#3  - Albuterol prn.      COPD exacerbation  - DAVIN Carrizales as needed.      VILLA  - Creatinine 1.4-->1.2-->1.1  - Given IVFs. Monitor labs  - baseline unclear.      Leukopenia   - most likely due to above  - Trend CBC     Single kidney  - states he was too small and his kidney stopped working. It was removed.       DVT Prophylaxis: Lovenox 30 mg BID  Diet: DIET GENERAL;  Code patient status: Full Code    Feels better; he is improving.    hopefully discharge home with home oxygen tomorrow    Bruna Trujillo MD 12/10/2020 6:31 PM

## 2020-12-10 NOTE — PROGRESS NOTES
Shift assessment complete; see flow sheet. Pt A/O x4. Scheduled medications administered; See MAR. IV infusing without difficulty. O2 2 LPM nc in place. Pt denies any needs at this time. Call light within reach, bed in low locked position.

## 2020-12-11 LAB
A/G RATIO: 1.1 (ref 1.1–2.2)
ALBUMIN SERPL-MCNC: 3.2 G/DL (ref 3.4–5)
ALP BLD-CCNC: 59 U/L (ref 40–129)
ALT SERPL-CCNC: 21 U/L (ref 10–40)
ANION GAP SERPL CALCULATED.3IONS-SCNC: 11 MMOL/L (ref 3–16)
AST SERPL-CCNC: 28 U/L (ref 15–37)
BASOPHILS ABSOLUTE: 0.1 K/UL (ref 0–0.2)
BASOPHILS RELATIVE PERCENT: 0.8 %
BILIRUB SERPL-MCNC: 0.4 MG/DL (ref 0–1)
BUN BLDV-MCNC: 28 MG/DL (ref 7–20)
CALCIUM SERPL-MCNC: 8.5 MG/DL (ref 8.3–10.6)
CHLORIDE BLD-SCNC: 107 MMOL/L (ref 99–110)
CO2: 22 MMOL/L (ref 21–32)
CREAT SERPL-MCNC: 1 MG/DL (ref 0.8–1.3)
EOSINOPHILS ABSOLUTE: 0 K/UL (ref 0–0.6)
EOSINOPHILS RELATIVE PERCENT: 0 %
GFR AFRICAN AMERICAN: >60
GFR NON-AFRICAN AMERICAN: >60
GLOBULIN: 3 G/DL
GLUCOSE BLD-MCNC: 125 MG/DL (ref 70–99)
HCT VFR BLD CALC: 40 % (ref 40.5–52.5)
HEMOGLOBIN: 13.5 G/DL (ref 13.5–17.5)
LYMPHOCYTES ABSOLUTE: 0.5 K/UL (ref 1–5.1)
LYMPHOCYTES RELATIVE PERCENT: 4.9 %
MCH RBC QN AUTO: 29.4 PG (ref 26–34)
MCHC RBC AUTO-ENTMCNC: 33.8 G/DL (ref 31–36)
MCV RBC AUTO: 87.1 FL (ref 80–100)
MONOCYTES ABSOLUTE: 0.8 K/UL (ref 0–1.3)
MONOCYTES RELATIVE PERCENT: 8.2 %
NEUTROPHILS ABSOLUTE: 8.1 K/UL (ref 1.7–7.7)
NEUTROPHILS RELATIVE PERCENT: 86.1 %
PDW BLD-RTO: 13.8 % (ref 12.4–15.4)
PLATELET # BLD: 200 K/UL (ref 135–450)
PMV BLD AUTO: 8.7 FL (ref 5–10.5)
POTASSIUM REFLEX MAGNESIUM: 4.6 MMOL/L (ref 3.5–5.1)
RBC # BLD: 4.59 M/UL (ref 4.2–5.9)
SODIUM BLD-SCNC: 140 MMOL/L (ref 136–145)
TOTAL PROTEIN: 6.2 G/DL (ref 6.4–8.2)
WBC # BLD: 9.4 K/UL (ref 4–11)

## 2020-12-11 PROCEDURE — 6360000002 HC RX W HCPCS: Performed by: PHYSICIAN ASSISTANT

## 2020-12-11 PROCEDURE — 99232 SBSQ HOSP IP/OBS MODERATE 35: CPT | Performed by: INTERNAL MEDICINE

## 2020-12-11 PROCEDURE — 85025 COMPLETE CBC W/AUTO DIFF WBC: CPT

## 2020-12-11 PROCEDURE — 99233 SBSQ HOSP IP/OBS HIGH 50: CPT | Performed by: INTERNAL MEDICINE

## 2020-12-11 PROCEDURE — 36415 COLL VENOUS BLD VENIPUNCTURE: CPT

## 2020-12-11 PROCEDURE — 2580000003 HC RX 258: Performed by: INTERNAL MEDICINE

## 2020-12-11 PROCEDURE — 94640 AIRWAY INHALATION TREATMENT: CPT

## 2020-12-11 PROCEDURE — 94761 N-INVAS EAR/PLS OXIMETRY MLT: CPT

## 2020-12-11 PROCEDURE — 6370000000 HC RX 637 (ALT 250 FOR IP): Performed by: INTERNAL MEDICINE

## 2020-12-11 PROCEDURE — 2700000000 HC OXYGEN THERAPY PER DAY

## 2020-12-11 PROCEDURE — 1200000000 HC SEMI PRIVATE

## 2020-12-11 PROCEDURE — 80053 COMPREHEN METABOLIC PANEL: CPT

## 2020-12-11 PROCEDURE — 2500000003 HC RX 250 WO HCPCS: Performed by: INTERNAL MEDICINE

## 2020-12-11 PROCEDURE — 2580000003 HC RX 258: Performed by: PHYSICIAN ASSISTANT

## 2020-12-11 RX ADMIN — MONTELUKAST SODIUM 10 MG: 10 TABLET, COATED ORAL at 20:58

## 2020-12-11 RX ADMIN — GABAPENTIN 600 MG: 300 CAPSULE ORAL at 09:35

## 2020-12-11 RX ADMIN — Medication 10 ML: at 20:59

## 2020-12-11 RX ADMIN — Medication 2 PUFF: at 19:22

## 2020-12-11 RX ADMIN — GABAPENTIN 600 MG: 300 CAPSULE ORAL at 20:58

## 2020-12-11 RX ADMIN — DEXAMETHASONE 6 MG: 4 TABLET ORAL at 09:35

## 2020-12-11 RX ADMIN — Medication 2 PUFF: at 06:52

## 2020-12-11 RX ADMIN — ENOXAPARIN SODIUM 30 MG: 100 INJECTION SUBCUTANEOUS at 20:59

## 2020-12-11 RX ADMIN — TAMSULOSIN HYDROCHLORIDE 0.4 MG: 0.4 CAPSULE ORAL at 09:35

## 2020-12-11 RX ADMIN — REMDESIVIR 100 MG: 100 INJECTION, POWDER, LYOPHILIZED, FOR SOLUTION INTRAVENOUS at 20:58

## 2020-12-11 RX ADMIN — Medication 2 PUFF: at 11:27

## 2020-12-11 RX ADMIN — Medication 2 PUFF: at 16:19

## 2020-12-11 RX ADMIN — ENOXAPARIN SODIUM 30 MG: 100 INJECTION SUBCUTANEOUS at 09:35

## 2020-12-11 NOTE — PROGRESS NOTES
Pulmonary Progress Note    CC: Shortness of breath, hypoxemia, COVID-19 pneumonia    Subjective: Feels he is doing well, shortness of breath is better    IV line: Peripheral      Intake/Output Summary (Last 24 hours) at 12/11/2020 1638  Last data filed at 12/11/2020 1252  Gross per 24 hour   Intake 480 ml   Output --   Net 480 ml       Exam:   /83   Pulse 89   Temp 97.1 °F (36.2 °C) (Oral)   Resp 18   Ht 5' 8\" (1.727 m)   Wt 160 lb (72.6 kg)   SpO2 95%   BMI 24.33 kg/m²  on 1 L  Gen: No distress. Alert. Eyes: PERRL. No sclera icterus. No conjunctival injection. ENT: No discharge. Pharynx clear. Neck: Trachea midline. Normal thyroid. Resp: No accessory muscle use. No crackles. No wheezes. No rhonchi. No dullness on percussion. Very poor air movement bilaterally  CV: Regular rate. Regular rhythm. No murmur or rub. No edema. GI: Non-tender. Non-distended. No masses. No organomegaly. Normal bowel sounds. No hernia. Skin: Warm and dry. No nodule on exposed extremities. No rash on exposed extremities. Lymph: No cervical LAD. No supraclavicular LAD. M/S: No cyanosis. No joint deformity. No clubbing. Neuro: Awake. Moves all extremities. CN grossly intact. Psych: Oriented x 3. No anxiety or agitation.      Scheduled Meds:   albuterol sulfate HFA  2 puff Inhalation 4x daily    ipratropium  2 puff Inhalation 4x daily    fluticasone  1 spray Each Nostril Daily    enoxaparin  30 mg Subcutaneous BID    sodium chloride flush  10 mL Intravenous 2 times per day    dexamethasone  6 mg Oral Daily    gabapentin  600 mg Oral BID    montelukast  10 mg Oral Nightly    tamsulosin  0.4 mg Oral Daily    sodium chloride  20 mL Intravenous Once    remdesivir IVPB  100 mg Intravenous Q24H     Continuous Infusions:    PRN Meds:  albuterol sulfate HFA, acetaminophen **OR** acetaminophen, sodium chloride flush, potassium chloride **OR** potassium alternative oral replacement **OR** potassium chloride, magnesium sulfate, polyethylene glycol, promethazine **OR** ondansetron, sodium chloride, albuterol sulfate HFA    Labs:  CBC:   Recent Labs     12/09/20  0634 12/10/20  0647 12/11/20  0605   WBC 3.3* 6.6 9.4   HGB 13.0* 13.6 13.5   HCT 39.6* 40.2* 40.0*   MCV 88.4 86.1 87.1    201 200     BMP:   Recent Labs     12/09/20  0634 12/10/20  0647 12/11/20  0605    141 140   K 5.3* 4.4 4.6    107 107   CO2 23 23 22   BUN 21* 27* 28*   CREATININE 1.2 1.1 1.0     LIVER PROFILE:   Recent Labs     12/09/20  0634 12/10/20  0647 12/11/20  0605   AST 17 25 28   ALT 9* 13 21   BILITOT <0.2 0.4 0.4   ALKPHOS 56 58 59     PT/INR: No results for input(s): PROTIME, INR in the last 72 hours. APTT: No results for input(s): APTT in the last 72 hours. UA:No results for input(s): NITRITE, COLORU, PHUR, LABCAST, WBCUA, RBCUA, MUCUS, TRICHOMONAS, YEAST, BACTERIA, CLARITYU, SPECGRAV, LEUKOCYTESUR, UROBILINOGEN, BILIRUBINUR, BLOODU, GLUCOSEU, AMORPHOUS in the last 72 hours. Invalid input(s): KETONESU  No results for input(s): PHART, BGS5RTJ, PO2ART in the last 72 hours.   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

## 2020-12-11 NOTE — FLOWSHEET NOTE
12/10/20 5580   Assessment   Charting Type Shift assessment   Neurological   Neuro (WDL) WDL   Level of Consciousness Alert (0)   Magda Coma Scale   Eye Opening 4   Best Verbal Response 5   Best Motor Response 6   Magda Coma Scale Score 15   HEENT   HEENT (WDL) WDL   Respiratory   Respiratory (WDL) X  (SOB Covid positive)   Respiratory Quality/Effort Unlabored   Chest Assessment Chest expansion symmetrical   Breath Sounds   Right Upper Lobe Expiratory Wheezes   Right Middle Lobe Expiratory Wheezes   Right Lower Lobe Clear   Left Upper Lobe Clear   Left Lower Lobe Clear   Cardiac   Cardiac (WDL) WDL   Cardiac Monitor   Telemetry Monitor On Yes   Gastrointestinal   Abdominal (WDL) WDL   Peripheral Vascular   Peripheral Vascular (WDL) WDL   Edema None   Skin Color/Condition   Skin Color/Condition (WDL) WDL   Skin Integrity   Skin Integrity (WDL) WDL   Musculoskeletal   Musculoskeletal (WDL) WDL   Genitourinary   Genitourinary (WDL) WDL   Flank Tenderness No   Suprapubic Tenderness No   Dysuria No   Anus/Rectum   Anus/Rectum (WDL) WDL   Psychosocial   Psychosocial (WDL) WDL   Shift assessment complete. See flow sheet. Scheduled meds given. See MAR. Call light and bedside table within reach. No further needs noted at this time.

## 2020-12-11 NOTE — PLAN OF CARE
Problem: Airway Clearance - Ineffective  Goal: Achieve or maintain patent airway  12/11/2020 1348 by Don Carranza RN  Outcome: Ongoing  12/11/2020 0258 by Dion Liz RN  Outcome: Ongoing     Problem: Gas Exchange - Impaired  Goal: Absence of hypoxia  12/11/2020 1348 by Don Carranza RN  Outcome: Ongoing  12/11/2020 0258 by Dion Liz RN  Outcome: Ongoing  Goal: Promote optimal lung function  12/11/2020 1348 by Don Carranza RN  Outcome: Ongoing  12/11/2020 0258 by Dion Liz RN  Outcome: Ongoing     Problem: Breathing Pattern - Ineffective  Goal: Ability to achieve and maintain a regular respiratory rate  12/11/2020 1348 by Don Carranza RN  Outcome: Ongoing  12/11/2020 0258 by Dion Liz RN  Outcome: Ongoing     Problem:  Body Temperature -  Risk of, Imbalanced  Goal: Ability to maintain a body temperature within defined limits  12/11/2020 1348 by Don Carranza RN  Outcome: Ongoing  12/11/2020 0258 by Dion Liz RN  Outcome: Ongoing  Goal: Will regain or maintain usual level of consciousness  12/11/2020 1348 by Don Carranza RN  Outcome: Ongoing  12/11/2020 0258 by Dion Liz RN  Outcome: Ongoing  Goal: Complications related to the disease process, condition or treatment will be avoided or minimized  12/11/2020 1348 by Don Carranza RN  Outcome: Ongoing  12/11/2020 0258 by Dion Liz RN  Outcome: Ongoing     Problem: Isolation Precautions - Risk of Spread of Infection  Goal: Prevent transmission of infection  12/11/2020 1348 by Don Carranza RN  Outcome: Ongoing  12/11/2020 0258 by Dion Liz RN  Outcome: Ongoing     Problem: Nutrition Deficits  Goal: Optimize nutrtional status  12/11/2020 1348 by Don Carranza RN  Outcome: Ongoing  12/11/2020 0258 by Dion Liz RN  Outcome: Ongoing     Problem: Risk for Fluid Volume Deficit  Goal: Maintain normal heart rhythm  12/11/2020 1348 by Don Carranza RN  Outcome: Ongoing  12/11/2020 0258 by Adi Khan RN  Outcome: Ongoing  Goal: Maintain absence of muscle cramping  12/11/2020 1348 by Linda Lopez RN  Outcome: Ongoing  12/11/2020 0258 by Adi Khan RN  Outcome: Ongoing  Goal: Maintain normal serum potassium, sodium, calcium, phosphorus, and pH  12/11/2020 1348 by Linda Lopez RN  Outcome: Ongoing  12/11/2020 0258 by Adi Khan RN  Outcome: Ongoing     Problem: Loneliness or Risk for Loneliness  Goal: Demonstrate positive use of time alone when socialization is not possible  12/11/2020 1348 by Linda Lopez RN  Outcome: Ongoing  12/11/2020 0258 by Adi Khan RN  Outcome: Ongoing     Problem: Fatigue  Goal: Verbalize increase energy and improved vitality  12/11/2020 1348 by Linda Lopez RN  Outcome: Ongoing  12/11/2020 0258 by Adi Khan RN  Outcome: Ongoing     Problem: Patient Education: Go to Patient Education Activity  Goal: Patient/Family Education  12/11/2020 1348 by Linda Lopez RN  Outcome: Ongoing  12/11/2020 0258 by Adi Khan RN  Outcome: Ongoing     Problem: Falls - Risk of:  Goal: Will remain free from falls  Description: Will remain free from falls  12/11/2020 1348 by Linda Lopez RN  Outcome: Ongoing  12/11/2020 0258 by Adi Khan RN  Outcome: Ongoing  Goal: Absence of physical injury  Description: Absence of physical injury  12/11/2020 1348 by Linda Lopez RN  Outcome: Ongoing  12/11/2020 0258 by Adi Khan RN  Outcome: Ongoing     Problem: Skin Integrity:  Goal: Will show no infection signs and symptoms  Description: Will show no infection signs and symptoms  12/11/2020 1348 by Linda Lopez RN  Outcome: Ongoing  12/11/2020 0258 by Adi Khan RN  Outcome: Ongoing  Goal: Absence of new skin breakdown  Description: Absence of new skin breakdown  12/11/2020 1348 by Linda Lopez RN  Outcome: Ongoing  12/11/2020 0258 by Adi Khan RN  Outcome: Ongoing

## 2020-12-11 NOTE — PROGRESS NOTES
Progress Note    Admit Date:  12/8/2020    Patient admitted with COVID-19 infection and hypoxia      Subjective:  Mr. Nicky Farmer is feeling better today . O2 requirement down to 1 L this morning.   however patient desaturated on this, O2 sats around 86% with ambulation today , back up to 2 L.   he sitting up in bed, doing well, shortness of breath is improved      Objective:   /83   Pulse 89   Temp 97.1 °F (36.2 °C) (Oral)   Resp 18   Ht 5' 8\" (1.727 m)   Wt 160 lb (72.6 kg)   SpO2 94%   BMI 24.33 kg/m²       Intake/Output Summary (Last 24 hours) at 12/11/2020 1510  Last data filed at 12/11/2020 1252  Gross per 24 hour   Intake 480 ml   Output --   Net 480 ml         Physical Exam:  General:  Awake, alert, NAD  Skin:  Warm and dry  Neck:  JVD absent. Neck supple  Chest:  Diminished breath sounds, no wheezes rales or rhonchi  Cardiovascular:  RRR ,S1S2 normal  Abdomen:  Soft, non tender, non distended, BS +  Extremities:  No edema. Intact peripheral pulses.  Brisk cap refill, < 2 secs  Neuro: non focal      Medications:   Scheduled Meds:   albuterol sulfate HFA  2 puff Inhalation 4x daily    ipratropium  2 puff Inhalation 4x daily    fluticasone  1 spray Each Nostril Daily    enoxaparin  30 mg Subcutaneous BID    sodium chloride flush  10 mL Intravenous 2 times per day    dexamethasone  6 mg Oral Daily    gabapentin  600 mg Oral BID    montelukast  10 mg Oral Nightly    tamsulosin  0.4 mg Oral Daily    sodium chloride  20 mL Intravenous Once    remdesivir IVPB  100 mg Intravenous Q24H       Continuous Infusions:      Data:  CBC:   Recent Labs     12/09/20  0634 12/10/20  0647 12/11/20  0605   WBC 3.3* 6.6 9.4   RBC 4.48 4.67 4.59   HGB 13.0* 13.6 13.5   HCT 39.6* 40.2* 40.0*   MCV 88.4 86.1 87.1   RDW 13.7 13.6 13.8    201 200     BMP:   Recent Labs     12/09/20  0634 12/10/20  0647 12/11/20  0605    141 140   K 5.3* 4.4 4.6    107 107   CO2 23 23 22   BUN 21* 27* 28* CREATININE 1.2 1.1 1.0     BNP: No results for input(s): BNP in the last 72 hours. PT/INR: No results for input(s): PROTIME, INR in the last 72 hours. APTT: No results for input(s): APTT in the last 72 hours. CARDIAC ENZYMES:   No results for input(s): CKMB, CKMBINDEX, TROPONINI in the last 72 hours. Invalid input(s): CKTOTAL;3  FASTING LIPID PANEL:No results found for: CHOL, HDL, TRIG  LIVER PROFILE:   Recent Labs     12/09/20  0634 12/10/20  0647 12/11/20  0605   AST 17 25 28   ALT 9* 13 21   BILITOT <0.2 0.4 0.4   ALKPHOS 56 58 59        Radiology  XR CHEST PORTABLE   Final Result   Increasing bibasilar pneumonia. Assessment:  Active Problems:    Acute respiratory failure with hypoxia (HCC)    Pneumonia due to COVID-19 virus    COPD exacerbation (HCC)    COVID-19    High risk medication use    VILLA (acute kidney injury) ([facility] [address]  Resolved Problems:    * No resolved hospital problems. *      Plan:  Acute hypoxic respiratory failure  - due to COVID pneumonia  - patient admitted to med-surg on tele, continuous pulse ox  - on 3 L O2-> 2L now . Wean as tolerated. O2 requirement down to 2 L now. Continue to monitor with pulse oximetry    COVID-19 pneumonia  - pulmonology consulted. - CXR - + ASD  -S/p transfusion of 1 unit CCP. -Continue remdesivir D#4, Decadron D#4  - Albuterol prn.      COPD exacerbation  - DAVIN Carrizales as needed.      VILLA  - Creatinine 1.4-->1.2-->1.1  - Given IVFs. Monitor labs  - baseline unclear.      Leukopenia   - most likely due to above  - Trend CBC     Single kidney  - states he was too small and his kidney stopped working. It was removed.       DVT Prophylaxis: Lovenox 30 mg BID  Diet: DIET GENERAL;  Code patient status: Full Code      Improving; likely discharge home tomorrow on home oxygen and metered-dose inhalers after he gets his day 5 dose of remdesivir.     Laura Ortiz MD 12/11/2020 3:10 PM

## 2020-12-11 NOTE — PROGRESS NOTES
Patient resting as manifested by eyes closed in dark room. Respiration easy and even. Call light and bed side table in reach. Bed low, locked and in lowest position. Denies any needs at this time.

## 2020-12-11 NOTE — PROGRESS NOTES
**OR** ondansetron, sodium chloride, albuterol sulfate HFA    Labs:  CBC:   Recent Labs     12/08/20 1958 12/09/20 0634 12/10/20  0647   WBC 3.8* 3.3* 6.6   HGB 13.4* 13.0* 13.6   HCT 39.6* 39.6* 40.2*   MCV 87.3 88.4 86.1    158 201     BMP:   Recent Labs     12/08/20 1958 12/09/20  0634 12/10/20  0647   * 140 141   K 4.3 5.3* 4.4    105 107   CO2 23 23 23   BUN 22* 21* 27*   CREATININE 1.3 1.2 1.1     LIVER PROFILE:   Recent Labs     12/08/20 1958 12/09/20 0634 12/10/20  0647   AST 16 17 25   ALT 8* 9* 13   BILITOT 0.3 <0.2 0.4   ALKPHOS 58 56 58     PT/INR: No results for input(s): PROTIME, INR in the last 72 hours. APTT: No results for input(s): APTT in the last 72 hours. UA:No results for input(s): NITRITE, COLORU, PHUR, LABCAST, WBCUA, RBCUA, MUCUS, TRICHOMONAS, YEAST, BACTERIA, CLARITYU, SPECGRAV, LEUKOCYTESUR, UROBILINOGEN, BILIRUBINUR, BLOODU, GLUCOSEU, AMORPHOUS in the last 72 hours. Invalid input(s): KETONESU  No results for input(s): PHART, ICH2AWX, PO2ART in the last 72 hours.   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

## 2020-12-12 VITALS
OXYGEN SATURATION: 96 % | BODY MASS INDEX: 24.25 KG/M2 | HEIGHT: 68 IN | TEMPERATURE: 97.4 F | SYSTOLIC BLOOD PRESSURE: 128 MMHG | WEIGHT: 160 LBS | DIASTOLIC BLOOD PRESSURE: 84 MMHG | HEART RATE: 85 BPM | RESPIRATION RATE: 18 BRPM

## 2020-12-12 LAB
A/G RATIO: 1.1 (ref 1.1–2.2)
ALBUMIN SERPL-MCNC: 3.1 G/DL (ref 3.4–5)
ALP BLD-CCNC: 73 U/L (ref 40–129)
ALT SERPL-CCNC: 51 U/L (ref 10–40)
ANION GAP SERPL CALCULATED.3IONS-SCNC: 10 MMOL/L (ref 3–16)
AST SERPL-CCNC: 56 U/L (ref 15–37)
BASOPHILS ABSOLUTE: 0 K/UL (ref 0–0.2)
BASOPHILS RELATIVE PERCENT: 0.3 %
BILIRUB SERPL-MCNC: 0.4 MG/DL (ref 0–1)
BUN BLDV-MCNC: 28 MG/DL (ref 7–20)
CALCIUM SERPL-MCNC: 8.1 MG/DL (ref 8.3–10.6)
CHLORIDE BLD-SCNC: 105 MMOL/L (ref 99–110)
CO2: 22 MMOL/L (ref 21–32)
CREAT SERPL-MCNC: 1 MG/DL (ref 0.8–1.3)
CULTURE, BLOOD 2: NORMAL
EOSINOPHILS ABSOLUTE: 0 K/UL (ref 0–0.6)
EOSINOPHILS RELATIVE PERCENT: 0 %
GFR AFRICAN AMERICAN: >60
GFR NON-AFRICAN AMERICAN: >60
GLOBULIN: 2.9 G/DL
GLUCOSE BLD-MCNC: 120 MG/DL (ref 70–99)
HCT VFR BLD CALC: 40.4 % (ref 40.5–52.5)
HEMOGLOBIN: 13.7 G/DL (ref 13.5–17.5)
LYMPHOCYTES ABSOLUTE: 0.5 K/UL (ref 1–5.1)
LYMPHOCYTES RELATIVE PERCENT: 6.5 %
MCH RBC QN AUTO: 29.4 PG (ref 26–34)
MCHC RBC AUTO-ENTMCNC: 33.8 G/DL (ref 31–36)
MCV RBC AUTO: 86.7 FL (ref 80–100)
MONOCYTES ABSOLUTE: 0.9 K/UL (ref 0–1.3)
MONOCYTES RELATIVE PERCENT: 10.7 %
NEUTROPHILS ABSOLUTE: 6.9 K/UL (ref 1.7–7.7)
NEUTROPHILS RELATIVE PERCENT: 82.5 %
PDW BLD-RTO: 13.8 % (ref 12.4–15.4)
PLATELET # BLD: 228 K/UL (ref 135–450)
PMV BLD AUTO: 8.7 FL (ref 5–10.5)
POTASSIUM REFLEX MAGNESIUM: 4.3 MMOL/L (ref 3.5–5.1)
RBC # BLD: 4.66 M/UL (ref 4.2–5.9)
SODIUM BLD-SCNC: 137 MMOL/L (ref 136–145)
TOTAL PROTEIN: 6 G/DL (ref 6.4–8.2)
WBC # BLD: 8.4 K/UL (ref 4–11)

## 2020-12-12 PROCEDURE — 2580000003 HC RX 258: Performed by: PHYSICIAN ASSISTANT

## 2020-12-12 PROCEDURE — 2700000000 HC OXYGEN THERAPY PER DAY

## 2020-12-12 PROCEDURE — 94761 N-INVAS EAR/PLS OXIMETRY MLT: CPT

## 2020-12-12 PROCEDURE — 6360000002 HC RX W HCPCS: Performed by: PHYSICIAN ASSISTANT

## 2020-12-12 PROCEDURE — 6370000000 HC RX 637 (ALT 250 FOR IP): Performed by: INTERNAL MEDICINE

## 2020-12-12 PROCEDURE — 36415 COLL VENOUS BLD VENIPUNCTURE: CPT

## 2020-12-12 PROCEDURE — 85025 COMPLETE CBC W/AUTO DIFF WBC: CPT

## 2020-12-12 PROCEDURE — 2580000003 HC RX 258: Performed by: INTERNAL MEDICINE

## 2020-12-12 PROCEDURE — 93005 ELECTROCARDIOGRAM TRACING: CPT | Performed by: INTERNAL MEDICINE

## 2020-12-12 PROCEDURE — 80053 COMPREHEN METABOLIC PANEL: CPT

## 2020-12-12 PROCEDURE — 94640 AIRWAY INHALATION TREATMENT: CPT

## 2020-12-12 PROCEDURE — 2500000003 HC RX 250 WO HCPCS: Performed by: INTERNAL MEDICINE

## 2020-12-12 RX ORDER — DEXAMETHASONE 6 MG/1
6 TABLET ORAL DAILY
Qty: 6 TABLET | Refills: 0 | Status: SHIPPED | OUTPATIENT
Start: 2020-12-13 | End: 2020-12-19

## 2020-12-12 RX ORDER — ALBUTEROL SULFATE 90 UG/1
2 AEROSOL, METERED RESPIRATORY (INHALATION) EVERY 6 HOURS PRN
Qty: 1 INHALER | Refills: 3 | Status: SHIPPED | OUTPATIENT
Start: 2020-12-12

## 2020-12-12 RX ADMIN — Medication 10 ML: at 08:36

## 2020-12-12 RX ADMIN — TAMSULOSIN HYDROCHLORIDE 0.4 MG: 0.4 CAPSULE ORAL at 08:35

## 2020-12-12 RX ADMIN — Medication 2 PUFF: at 07:17

## 2020-12-12 RX ADMIN — ENOXAPARIN SODIUM 30 MG: 100 INJECTION SUBCUTANEOUS at 08:35

## 2020-12-12 RX ADMIN — Medication 2 PUFF: at 10:55

## 2020-12-12 RX ADMIN — GABAPENTIN 600 MG: 300 CAPSULE ORAL at 08:35

## 2020-12-12 RX ADMIN — DEXAMETHASONE 6 MG: 4 TABLET ORAL at 08:35

## 2020-12-12 RX ADMIN — REMDESIVIR 100 MG: 100 INJECTION, POWDER, LYOPHILIZED, FOR SOLUTION INTRAVENOUS at 12:11

## 2020-12-12 NOTE — PLAN OF CARE
Problem: Airway Clearance - Ineffective  Goal: Achieve or maintain patent airway  12/11/2020 2244 by Brie Casey RN  Outcome: Ongoing  12/11/2020 1348 by Javier Mendiola RN  Outcome: Ongoing     Problem: Gas Exchange - Impaired  Goal: Absence of hypoxia  12/11/2020 2244 by Brie Casey RN  Outcome: Ongoing  12/11/2020 1348 by Javier Mendiola RN  Outcome: Ongoing  Goal: Promote optimal lung function  12/11/2020 2244 by Brie Casey RN  Outcome: Ongoing  12/11/2020 1348 by Javier Mendiola RN  Outcome: Ongoing     Problem: Breathing Pattern - Ineffective  Goal: Ability to achieve and maintain a regular respiratory rate  12/11/2020 2244 by Brie Casey RN  Outcome: Ongoing  12/11/2020 1348 by Javier Mendiola RN  Outcome: Ongoing     Problem:  Body Temperature -  Risk of, Imbalanced  Goal: Ability to maintain a body temperature within defined limits  12/11/2020 2244 by Brie Casey RN  Outcome: Ongoing  12/11/2020 1348 by Javier Mendiola RN  Outcome: Ongoing  Goal: Will regain or maintain usual level of consciousness  12/11/2020 2244 by Brie Casey RN  Outcome: Ongoing  12/11/2020 1348 by Javier Mendiola RN  Outcome: Ongoing  Goal: Complications related to the disease process, condition or treatment will be avoided or minimized  12/11/2020 2244 by Brie Casey RN  Outcome: Ongoing  12/11/2020 1348 by Javier Mendiola RN  Outcome: Ongoing     Problem: Isolation Precautions - Risk of Spread of Infection  Goal: Prevent transmission of infection  12/11/2020 2244 by Brie Casey RN  Outcome: Ongoing  12/11/2020 1348 by Javier Mendiola RN  Outcome: Ongoing     Problem: Nutrition Deficits  Goal: Optimize nutrtional status  12/11/2020 2244 by Brie Casey RN  Outcome: Ongoing  12/11/2020 1348 by Javier Mendiola RN  Outcome: Ongoing     Problem: Risk for Fluid Volume Deficit  Goal: Maintain normal heart rhythm  12/11/2020 2244 by Brie Casey RN  Outcome: Ongoing  12/11/2020 1348 by Linda Lopez RN  Outcome: Ongoing  Goal: Maintain absence of muscle cramping  12/11/2020 2244 by Adi Khan RN  Outcome: Ongoing  12/11/2020 1348 by Linda Lopez RN  Outcome: Ongoing  Goal: Maintain normal serum potassium, sodium, calcium, phosphorus, and pH  12/11/2020 2244 by Adi Khan RN  Outcome: Ongoing  12/11/2020 1348 by Linda Lopez RN  Outcome: Ongoing     Problem: Loneliness or Risk for Loneliness  Goal: Demonstrate positive use of time alone when socialization is not possible  12/11/2020 2244 by Adi Khan RN  Outcome: Ongoing  12/11/2020 1348 by Linda Lopez RN  Outcome: Ongoing     Problem: Fatigue  Goal: Verbalize increase energy and improved vitality  12/11/2020 2244 by Adi Khan RN  Outcome: Ongoing  12/11/2020 1348 by Linda Lopez RN  Outcome: Ongoing     Problem: Patient Education: Go to Patient Education Activity  Goal: Patient/Family Education  12/11/2020 2244 by Adi Khan RN  Outcome: Ongoing  12/11/2020 1348 by Linda Lopez RN  Outcome: Ongoing     Problem: Falls - Risk of:  Goal: Will remain free from falls  Description: Will remain free from falls  12/11/2020 2244 by Adi Khan RN  Outcome: Ongoing  12/11/2020 1348 by Linda Lopez RN  Outcome: Ongoing  Goal: Absence of physical injury  Description: Absence of physical injury  12/11/2020 2244 by Adi Khan RN  Outcome: Ongoing  12/11/2020 1348 by Linda Lopez RN  Outcome: Ongoing     Problem: Skin Integrity:  Goal: Will show no infection signs and symptoms  Description: Will show no infection signs and symptoms  12/11/2020 2244 by Adi Khan RN  Outcome: Ongoing  12/11/2020 1348 by Linda Lopez RN  Outcome: Ongoing  Goal: Absence of new skin breakdown  Description: Absence of new skin breakdown  12/11/2020 2244 by Adi Khan RN  Outcome: Ongoing  12/11/2020 1348 by Linda Lopez RN  Outcome: Ongoing

## 2020-12-12 NOTE — CARE COORDINATION
DISCHARGE ORDER  Date/Time 2020 12:31 PM  Completed by: Kennedi Wolf, Case Management    Patient Name: Rosie Farfan      : 1955  Admitting Diagnosis: Acute respiratory failure with hypoxia ([facility] [address] [J96.01]  Acute respiratory failure with hypoxia ([facility] [address] [J96.01]      Admit order Date and Status:2020 inpt  (verify MD's last order for status of admission)      Noted discharge order. If applicable PT/OT recommendation at Discharge: n/a  DME recommendation by PT/OT:n/a  Confirmed discharge plan  (pt): Yes  with whom____________pt___  If pt confirmed DC plan does family need to be contacted by CM No if yes who____n/a_  Discharge Plan: Reviewed chart. Role of discharge planner explained and patient verbalized understanding. Discharge order is noted. Pt is being d/c'd to home today. Pt is Covid positive (as of 2020). Pt is going to need oxygen and CM spoke with Pia Carpenter RN, and she is going to walk pt and let CM know how much O2 pt will need. Pt wants O2 from [facility]. CM spoke with pt. Pt declines HHC. Pt states that his daughter is a HHC nurse. Pt's O2 sats are 90% on 1L. No further discharge needs needed or noted. Reviewed chart. Role of discharge planner explained and patient verbalized understanding. Discharge order is noted. Has Home O2 in place on admit:  Pt is a new start on home O2. Informed of need to bring portable home O2 tank on day of discharge for nursing to connect prior to leaving:   Pt is a new start on home O2. Verbalized agreement/Understanding:   Pt is a new start on home O2. Discharge timeout done with Pia Carpenter RN. All discharge needs and concerns addressed. Addendum 2020 1323: Smith Eldridge RN has note in.
INTERDISCIPLINARY PLAN OF CARE CONFERENCE    Date/Time: 12/11/2020 1:27 PM  Completed by: Dionicio Krabbe, Case Management      Patient Name:  Dexter Villegas  YOB: 1955  Admitting Diagnosis: Acute respiratory failure with hypoxia ([facility] [address] [J96.01]  Acute respiratory failure with hypoxia ([address] [J96.01]     Admit Date/Time:  12/8/2020  1:23 PM    Chart reviewed. Interdisciplinary team contacted or reviewed plan related to patient progress and discharge plans. Disciplines included Case Management, Nursing, and Dietitian. Current Status: IP 12/08/2020  PT/OT recommendation for discharge plan of care: NA    Expected D/C Disposition:  Home  Confirmed plan with patient (phone)    Discharge Plan Comments: Chart reviewed. Met with pt at bedside and explained the role of the CM. +C19 and still requiring supplemental O2 Will need testing on DC and pt prefers [employer] for DME supplier. Anticipate DC Saturday w/ O2. +CM following.     Home O2 in place on admit: No
role/services. Chart reviewed. Pt is from house with spouse and plans to return. Pt is IPTA +drives. Pt is COVID+ on 3L O2. Will follow for home O2 needs.

## 2020-12-12 NOTE — FLOWSHEET NOTE
12/11/20 2100   Assessment   Charting Type Shift assessment   Neurological   Neuro (WDL) WDL   Level of Consciousness Alert (0)   Magda Coma Scale   Eye Opening 4   Best Verbal Response 5   Best Motor Response 6   Magda Coma Scale Score 15   NIH/MNHISS Stroke Scale   NIH/MNIHSS Stroke Scale Assessed No   HEENT   HEENT (WDL) WDL   Respiratory   Respiratory (WDL) X  (SOB Covid positive)   Respiratory Pattern Regular   Respiratory Depth Normal   Respiratory Quality/Effort Unlabored   Chest Assessment Chest expansion symmetrical   L Breath Sounds Clear   R Breath Sounds Clear   Breath Sounds   Right Upper Lobe Expiratory Wheezes   Right Middle Lobe Expiratory Wheezes   Right Lower Lobe Clear   Left Upper Lobe Clear   Left Lower Lobe Clear   Cardiac   Cardiac (WDL) WDL   Cardiac Monitor   Telemetry Monitor On Yes   Gastrointestinal   Abdominal (WDL) WDL   Peripheral Vascular   Peripheral Vascular (WDL) WDL   Edema None   Skin Color/Condition   Skin Color/Condition (WDL) WDL   Skin Integrity   Skin Integrity (WDL) WDL   Musculoskeletal   Musculoskeletal (WDL) WDL   Genitourinary   Genitourinary (WDL) WDL   Flank Tenderness No   Suprapubic Tenderness No   Dysuria No   Anus/Rectum   Anus/Rectum (WDL) WDL  (Per Patient )   Psychosocial   Psychosocial (WDL) WDL   Shift assessment complete. See flow sheet. Scheduled meds given. See MAR. Patient on 1L O2. Call light and bedside table within reach. No further needs noted at this time. Will continue to monitor.

## 2020-12-12 NOTE — PROGRESS NOTES
Patient 88%  at rest on RA. Patient 92% on 1 L at rest.  Patient 88% on RA ambulating  Patient 89% at  1 L ambulating.

## 2020-12-12 NOTE — PROGRESS NOTES
Monitor room reported that patient had 16 beats of PAT with rate of 170. Writer went to check on pt and during this time he reports that he was in the bathroom and brown lead was off. Notified Dr. Vicky Ernandez and received order for EKG for to instruct patient to follow up with PCP regarding need for echocardiogram in 1 week. After EKG patient okay to discharge.

## 2020-12-12 NOTE — PROGRESS NOTES
Shift assessment complete- see flowsheets. Pt is A&Ox4. VSS  Respirations are easy, even and unlabored. Pt is on 1L NC SpO2 91%    PIV WDL. Flushed at this time. Plan of care and goals reviewed. Call light within reach. Bed in lowest position with wheels locked. No needs expressed at this time. Will continue to monitor.

## 2020-12-12 NOTE — PROGRESS NOTES
EKG results NSR; unchanged from previous EKG. Reviewed AVS with patient and instructed him to follow up with PCP regarding need for echocardiogram related to 16 beats of PAT with rate of 170 bpm. Verbalized understanding. Wife here to  patient and brought oxygen tank from [employer]. Patient transported to vehicle by w/c.

## 2020-12-13 LAB
BLOOD CULTURE, ROUTINE: NORMAL
EKG ATRIAL RATE: 66 BPM
EKG DIAGNOSIS: NORMAL
EKG P AXIS: 83 DEGREES
EKG P-R INTERVAL: 114 MS
EKG Q-T INTERVAL: 368 MS
EKG QRS DURATION: 84 MS
EKG QTC CALCULATION (BAZETT): 385 MS
EKG R AXIS: 56 DEGREES
EKG T AXIS: 64 DEGREES
EKG VENTRICULAR RATE: 66 BPM

## 2020-12-13 PROCEDURE — 93010 ELECTROCARDIOGRAM REPORT: CPT | Performed by: INTERNAL MEDICINE

## 2020-12-14 ENCOUNTER — CARE COORDINATION (OUTPATIENT)
Dept: CASE MANAGEMENT | Age: 65
End: 2020-12-14

## 2020-12-14 NOTE — CARE COORDINATION
[facility] Transitions Initial Follow Up Call    Call within 2 business days of discharge: Yes    Patient: Jareth Dooley Patient : 1955   MRN: [id number]  Reason for Admission: ARF with hypoxia   Discharge Date: 20 RARS: Readmission Risk Score: 16      Last Discharge [facility]       Complaint Diagnosis Description Type Department Provider    20 Concern For COVID-19; Shortness of Breath Acute respiratory failure with hypoxia ([address] . .. ED to Hosp-Admission (Discharged) (ADMITTED) [facility] MD Ashkan; Bindu Dooley. .. Spoke with: Amber Bowling : [facility]       Challenges to be reviewed by the provider   Additional needs identified to be addressed with provider No  none    Discussed COVID-19 related testing which was available at this time. Test results were positive. Patient informed of results, if available? Yes         Method of communication with provider : none    Advance Care Planning:   Does patient have an Advance Directive:  not on file. Was this a readmission? No  Patient stated reason for admission: sob   Patients top risk factors for readmission: medical condition    Care Transition Nurse (CTN) contacted the patient by telephone to perform post hospital discharge assessment. Verified name and  with patient as identifiers. Provided introduction to self, and explanation of the CTN role. CTN reviewed discharge instructions, medical action plan and red flags with patient who verbalized understanding. Patient given an opportunity to ask questions and does not have any further questions or concerns at this time. Were discharge instructions available to patient? Yes. Reviewed appropriate site of care based on symptoms and resources available to patient including: PCP and When to call 911. The patient agrees to contact the PCP office for questions related to their healthcare.      Medication reconciliation was performed with patient, who verbalizes understanding of administration of home medications. Advised obtaining a 90-day supply of all daily and as-needed medications. Covid Risk Education    Patient has following risk factors of: COPD. Education provided regarding infection prevention, and signs and symptoms of COVID-19 and when to seek medical attention with patient who verbalized understanding. Discussed exposure protocols and quarantine From CDC: Are you at higher risk for severe illness?   and given an opportunity for questions and concerns. The patient agrees to contact the COVID-19 hotline [phone] or PCP office for questions related to COVID-19. For more information on steps you can take to protect yourself, see CDC's How to Protect Yourself       Discussed follow-up appointments. If no appointment was previously scheduled, appointment scheduling offered: Yes. Is follow up appointment scheduled within 7 days of discharge? No  Non-[facility] follow up appointment(s): Patient to call PCP     Plan for follow-up call in 5-7 days based on severity of symptoms and risk factors. Spoke with patient who reported his breathing is about the same with no worsening sob or cough. Patient reported with oxygen on his o2 level is in the 90's. Patient reported he has all his medications and taking as prescribed. Patient encouraged to reach out to his PCP to setup follow up apt. Patient encouraged to continue to monitor O2 level and reported any worsening sob or decreased oxygen level. CTN advised patient of use of urgent care or physicians 24 hr access line if assistance is needed after hours. CTN provided contact information for future needs.           Care Transitions 24 Hour Call    Do you have any ongoing symptoms?: No  Do you have a copy of your discharge instructions?: Yes  Do you have all of your prescriptions and are they filled?: Yes  Have you been contacted by a [address]?: No  Have you scheduled your follow up appointment?: No  Were you discharged with any Home Care or Post Acute Services: No  Do you feel like you have everything you need to keep you well at home?: Yes  Care Transitions Interventions         Follow Up  No future appointments.     Pino Delgado RN

## 2020-12-17 ENCOUNTER — TELEPHONE (OUTPATIENT)
Dept: PULMONOLOGY | Age: 65
End: 2020-12-17

## 2020-12-17 NOTE — TELEPHONE ENCOUNTER
Patient returned the call and was informed he plans to get CXR the 18th or 19th of January he is scheduled for a VV on 1/20/21.  CXR order pending please sign

## 2020-12-21 ENCOUNTER — CARE COORDINATION (OUTPATIENT)
Dept: CASE MANAGEMENT | Age: 65
End: 2020-12-21

## 2020-12-21 NOTE — CARE COORDINATION
Patient contacted regarding COVID-19 risk and screening. Care Transition Nurse contacted the patient by telephone to perform follow-up COVID-19 assessment. COVID-19 Detected on 12/5/2020. Patient has following risk factors for COVID-19: COPD. Symptoms reviewed with patient who verbalized the following symptoms: fatigue, no new symptoms and no worsening symptoms. Due to no new or worsening symptoms encounter was not routed to provider for escalation. Education provided regarding infection prevention, and signs and symptoms of COVID-19 and when to seek medical attention with patient who verbalized understanding. Discussed exposure protocols and quarantine from [address] you at higher risk for severe illness 2019 and given an opportunity for questions and concerns. States he completed PCP visit virtually since last CTN outreach. He feels improved other than lingering fatigue. He denies needs at this time. CTN provided contact information for future reference. No further CTN outreach scheduled at this time.      Lili Hernandez RN  Care Transition Nurse  [phone] mobile    Future Appointments   Date Time Provider Department Center   1/20/2021  9:15 AM Santo Perez MD [facility]

## 2021-01-13 NOTE — DISCHARGE SUMMARY
Hospital Medicine Discharge Summary    Patient ID: Dwayne Doe      Patient's PCP: Patton Collet, MD    Admit Date: 12/8/2020     Discharge Date: 12/12/2020      Admitting Physician: Alma Zhou MD     Discharge Physician: Eugene Frias MD     Discharge Diagnoses: Active Hospital Problems    Diagnosis    VILLA (acute kidney injury) ([facility] [N17.9]    High risk medication use [Z79.899]    COPD exacerbation ([facility] [J44.1]    COVID-19 [U07.1]    Acute respiratory failure with hypoxia ([facility] [J96.01]    Pneumonia due to COVID-19 virus [U07.1, J12.89]       The patient was seen and examined on day of discharge and this discharge summary is in conjunction with any daily progress note from day of discharge. Hospital Course: The patient is a 72 y.o. male with COPD who presents to [facility] with c/o shortness of breath and hypoxia. Symptoms started about 8 days ago. He c/o headache, weakness, fevers, chills. He does not wear oxygen at home. He states his SpO2 dropped to 84% on RA when ambulating. He is requiring 2-3 L O2. He tested positive for COVID on 12/5/2020. States his wife tested positive before him. Labs with Creatinine 1.2, PCT 0.4, and leukopenia. CXR with increasing bibasilar pneumonia. Patient admitted to med-surg. Pulmonology consulted. Acute hypoxic respiratory failure  - due to COVID pneumonia  - patient admitted to med-surg on tele, continuous pulse ox  - on 3 L O2-> 2L now . Wean as tolerated. O2 requirement down to 2 L now. COVID-19 pneumonia  - pulmonology consulted. - CXR - + ASD  -S/p transfusion of 1 unit CCP. -Continue remdesivir D#5, Decadron D#5  - Albuterol prn.      COPD exacerbation  - DAVIN Carrizales as needed.      VILLA  - Creatinine 1.4-->1.2-->1.1  - Given IVFs.  Monitor labs  - baseline unclear.      Leukopenia   - most likely due to above  - Trend CBC     Single kidney - states he was too small and his kidney stopped working. Khadra Powell was removed.         Physical Exam Performed:     /84   Pulse 85   Temp 97.4 °F (36.3 °C) (Oral)   Resp 18   Ht 5' 8\" (1.727 m)   Wt 160 lb (72.6 kg)   SpO2 96%   BMI 24.33 kg/m²     General:  Awake, alert, NAD  Skin:  Warm and dry  Neck:  JVD absent. Neck supple  Chest:  Diminished breath sounds, no wheezes rales or rhonchi  Cardiovascular:  RRR ,S1S2 normal  Abdomen:  Soft, non tender, non distended, BS +  Extremities:  No edema. Intact peripheral pulses. Brisk cap refill, < 2 secs  Neuro: non focal    Labs: For convenience and continuity at follow-up the following most recent labs are provided:      CBC:    Lab Results   Component Value Date    WBC 8.4 12/12/2020    HGB 13.7 12/12/2020    HCT 40.4 12/12/2020     12/12/2020       Renal:    Lab Results   Component Value Date     12/12/2020    K 4.3 12/12/2020     12/12/2020    CO2 22 12/12/2020    BUN 28 12/12/2020    CREATININE 1.0 12/12/2020    CALCIUM 8.1 12/12/2020         Significant Diagnostic Studies    Radiology:   XR CHEST PORTABLE   Final Result   Increasing bibasilar pneumonia.                 Consults:     IP CONSULT TO HOSPITALIST  IP CONSULT TO PULMONOLOGY  IP CONSULT TO PHARMACY    Disposition:  home     Condition at Discharge: Stable    Discharge Instructions/Follow-up:  PCP in 1 week    Code Status:  Prior     Activity: activity as tolerated    Diet: regular diet      Discharge Medications:     Discharge Medication List as of 12/12/2020  3:26 PM           Details   albuterol sulfate  (90 Base) MCG/ACT inhaler Inhale 2 puffs into the lungs every 6 hours as needed for Wheezing, Disp-1 Inhaler, R-3Print      ipratropium (ATROVENT HFA) 17 MCG/ACT inhaler Inhale 2 puffs into the lungs 4 times daily, Disp-1 Inhaler, R-3Print      dexamethasone (DECADRON) 6 MG tablet Take 1 tablet by mouth daily for 6 days, Disp-6 tablet, R-0Print              Details

## 2021-01-19 ENCOUNTER — HOSPITAL ENCOUNTER (OUTPATIENT)
Dept: GENERAL RADIOLOGY | Age: 66
Discharge: HOME | End: 2021-01-19
Payer: MEDICARE

## 2021-01-19 ENCOUNTER — HOSPITAL ENCOUNTER (OUTPATIENT)
Age: 66
Discharge: HOME | End: 2021-01-19
Payer: MEDICARE

## 2021-01-19 DIAGNOSIS — U07.1 PNEUMONIA DUE TO COVID-19 VIRUS: ICD-10-CM

## 2021-01-19 DIAGNOSIS — J12.82 PNEUMONIA DUE TO COVID-19 VIRUS: ICD-10-CM

## 2021-01-19 PROCEDURE — 71046 X-RAY EXAM CHEST 2 VIEWS: CPT

## 2021-01-20 ENCOUNTER — TELEPHONE (OUTPATIENT)
Dept: PULMONOLOGY | Age: 66
End: 2021-01-20

## 2021-01-20 ENCOUNTER — VIRTUAL VISIT (OUTPATIENT)
Dept: PULMONOLOGY | Age: 66
End: 2021-01-20
Payer: MEDICARE

## 2021-01-20 DIAGNOSIS — Z87.891 HISTORY OF TOBACCO USE: ICD-10-CM

## 2021-01-20 DIAGNOSIS — J44.9 CHRONIC OBSTRUCTIVE PULMONARY DISEASE, UNSPECIFIED COPD TYPE (HCC): Primary | ICD-10-CM

## 2021-01-20 PROCEDURE — 99442 PR PHYS/QHP TELEPHONE EVALUATION 11-20 MIN: CPT | Performed by: INTERNAL MEDICINE

## 2021-01-20 RX ORDER — IPRATROPIUM BROMIDE 42 UG/1
SPRAY, METERED NASAL
COMMUNITY
Start: 2020-12-01

## 2021-01-20 RX ORDER — FLUTICASONE FUROATE, UMECLIDINIUM BROMIDE AND VILANTEROL TRIFENATATE 200; 62.5; 25 UG/1; UG/1; UG/1
1 POWDER RESPIRATORY (INHALATION) DAILY
Qty: 1 EACH | Refills: 5 | Status: SHIPPED | OUTPATIENT
Start: 2021-01-20

## 2021-01-20 NOTE — TELEPHONE ENCOUNTER
Within this Telehealth Consent, the terms you and yours refer to the person using the Telehealth Service (Service), or in the case of a use of the Service by or on behalf of a minor, you and yours refer to and include (i) the parent or legal guardian who provides consent to the use of the Service by such minor or uses the Service on behalf of such minor, and (ii) the minor for whom consent is being provided or on whose behalf the Service is being utilized. When using Service, you will be consulting with your health care providers via the use of Telehealth.   Telehealth involves the delivery of healthcare services using electronic communications, information technology or other means between a healthcare provider and a patient who are not in the same physical location. Telehealth may be used for diagnosis, treatment, follow-up and/or patient education, and may include, but is not limited to, one or more of the following:    Electronic transmission of medical records, photo images, personal health information or other data between a patient and a healthcare provider    Interactions between a patient and healthcare provider via audio, video and/or data communications    Use of output data from medical devices, sound and video files    Anticipated Benefits   The use of Telehealth by your Provider(s) through the Service may have the following possible benefits:    Making it easier and more efficient for you to access medical care and treatment for the conditions treated by such Provider(s) utilizing the Service    Allowing you to obtain medical care and treatment by Provider(s) at times that are convenient for you    Enabling you to interact with Provider(s) without the necessity of an in-office appointment     Possible Risks   While the use of Telehealth can provide potential benefits for you, there are also potential risks associated with the use of Telehealth.  These risks include, but may not be limited to the following:    Your Provider(s) may not able to provide medical treatment for your particular condition and you may be required to seek alternative healthcare or emergency care services.  The electronic systems or other security protocols or safeguards used in the Service could fail, causing a breach of privacy of your medical or other information.  Given regulatory requirements in certain jurisdictions, your Provider(s) diagnosis and/or treatment options, especially pertaining to certain prescriptions, may be limited. Acceptance   1. You understand that Services will be provided via Telehealth. This process involves the use of HIPAA compliant and secure, real-time audio-visual interfacing with a qualified and appropriately trained provider located at [facility]. 2. You understand that, under no circumstances, will this session be recorded. 3. You understand that the Provider(s) at [facility] and other clinical participants will be party to the information obtained during the Telehealth session in accordance with best medical practices. 4. You understand that the information obtained during the Telehealth session will be used to help determine the most appropriate treatment options. 5. You understand that You have the right to revoke this consent at any point in time. 6. You understand that Telehealth is voluntary, and that continued treatment is not dependent upon consent. 7. You understand that, in the event of non-consent to Telehealth services and/or technical difficulties, you will obtain services as typically provided in the absence of Telehealth technology. 8. You understand that this consent will be kept in Your medical record. 9. No potential benefits from the use of Telehealth or specific results can be guaranteed. Your condition may not be cured or improved and, in some cases, may get worse.    10. There are limitations in the provision of medical care and treatment via Telehealth and the Service and you may not be able to receive diagnosis and/or treatment through the Service for every condition for which you seek diagnosis and/or treatment. 11. There are potential risks to the use of Telehealth, including but not limited to the risks described in this Telehealth Consent. 12. Your Provider(s) have discussed the use of Telehealth and the Service with you, including the benefits and risks of such and you have provided oral consent to your Provider(s) for the use of Telehealth and the Service. 15. You understand that it is your duty to provide your Provider(s) truthful, accurate and complete information, including all relevant information regarding care that you may have received or may be receiving from other healthcare providers outside of the Service. 14. You understand that each of your Provider(s) may determine in his or sole discretion that your condition is not suitable for diagnosis and/or treatment using the Service, and that you may need to seek medical care and treatment a specialist or other healthcare provider, outside of the Service. 15. You understand that you are fully responsible for payment for all services provided by Provider(s) or through use of the Service and that you may not be able to use third-party insurance. 16. You represent that (a) you have read this Telehealth Consent carefully, (b) you understand the risks and benefits of the Service and the use of Telehealth in the medical care and treatment provided to you by Provider(s) using the Service, and (c) you have the legal capacity and authority to provide this consent for yourself and/or the minor for which you are consenting under applicable federal and state laws, including laws relating to the age of [de-identified] and/or parental/guardian consent.    17. You give your informed consent to the use of Telehealth by Provider(s) using the Service under the terms described in the Terms of Service and this Telehealth Consent. The patient was read the following statement and has consented to the visit as of 1/20/21. The patient has been scheduled for their first telehealth visit on 1/20/21 with .

## 2021-01-20 NOTE — PROGRESS NOTES
Pulmonary Follow-up Note  Chief Complaint: COVID, hypoxemia   Referring Provider: hospital f/u    Interval history: patient feels he is doing okay if he doesn't move fast.  Is 90-91% at rest, but desaturates to 80's with exertion. Uses Wixela. Wants to change oxygen to LRS/Yojana    Presenting history: 12/8/20 This is a 69-year-old male with a known history of COPD who was recently diagnosed with COVID-19. He has an 8-day history of fevers associated with chills. Subsequently developed moderately severe shortness of breath, worse with exertion, associated with hypoxemia with exertion, was 84% on room air with ambulation. The hypoxemia improved with supplemental oxygen. No similar prior episodes. reports that he quit smoking about 12 years ago. His smoking use included cigarettes. He has a 60.00 pack-year smoking history. He has never used smokeless tobacco.     Review of Systems:    Physical Exam:  There were no vitals taken for this visit. 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 Change to Yojana/LRS. Check with LRS to see if he needs to re-qualify.   He will requires a POC   · Low dose chest CT for lung cancer screening in 6 weeks  · Full PFT in 6 weeks  · See me same day in office as CT/PFT  · Please review plan with patient as we are doing several things

## 2021-03-08 ENCOUNTER — HOSPITAL ENCOUNTER (OUTPATIENT)
Age: 66
Discharge: HOME | End: 2021-03-08
Payer: MEDICARE

## 2021-03-08 LAB — SARS-COV-2: NOT DETECTED

## 2021-03-08 PROCEDURE — U0003 INFECTIOUS AGENT DETECTION BY NUCLEIC ACID (DNA OR RNA); SEVERE ACUTE RESPIRATORY SYNDROME CORONAVIRUS 2 (SARS-COV-2) (CORONAVIRUS DISEASE [COVID-19]), AMPLIFIED PROBE TECHNIQUE, MAKING USE OF HIGH THROUGHPUT TECHNOLOGIES AS DESCRIBED BY CMS-2020-01-R: HCPCS

## 2021-03-12 ENCOUNTER — OFFICE VISIT (OUTPATIENT)
Dept: PULMONOLOGY | Age: 66
End: 2021-03-12
Payer: MEDICARE

## 2021-03-12 ENCOUNTER — HOSPITAL ENCOUNTER (OUTPATIENT)
Dept: PULMONOLOGY | Age: 66
Discharge: HOME | End: 2021-03-12
Payer: MEDICARE

## 2021-03-12 ENCOUNTER — HOSPITAL ENCOUNTER (OUTPATIENT)
Dept: CT IMAGING | Age: 66
Discharge: HOME | End: 2021-03-12
Payer: MEDICARE

## 2021-03-12 VITALS
RESPIRATION RATE: 18 BRPM | TEMPERATURE: 96.7 F | SYSTOLIC BLOOD PRESSURE: 122 MMHG | DIASTOLIC BLOOD PRESSURE: 78 MMHG | OXYGEN SATURATION: 96 % | BODY MASS INDEX: 25.01 KG/M2 | HEIGHT: 68 IN | HEART RATE: 81 BPM | WEIGHT: 165 LBS

## 2021-03-12 VITALS — OXYGEN SATURATION: 98 %

## 2021-03-12 DIAGNOSIS — R91.1 PULMONARY NODULE: Primary | ICD-10-CM

## 2021-03-12 DIAGNOSIS — J44.9 CHRONIC OBSTRUCTIVE PULMONARY DISEASE, UNSPECIFIED COPD TYPE (HCC): ICD-10-CM

## 2021-03-12 DIAGNOSIS — Z87.891 HISTORY OF TOBACCO USE: ICD-10-CM

## 2021-03-12 PROCEDURE — G8926 SPIRO NO PERF OR DOC: HCPCS | Performed by: INTERNAL MEDICINE

## 2021-03-12 PROCEDURE — 94010 BREATHING CAPACITY TEST: CPT

## 2021-03-12 PROCEDURE — 4040F PNEUMOC VAC/ADMIN/RCVD: CPT | Performed by: INTERNAL MEDICINE

## 2021-03-12 PROCEDURE — 1123F ACP DISCUSS/DSCN MKR DOCD: CPT | Performed by: INTERNAL MEDICINE

## 2021-03-12 PROCEDURE — 6370000000 HC RX 637 (ALT 250 FOR IP): Performed by: INTERNAL MEDICINE

## 2021-03-12 PROCEDURE — 3017F COLORECTAL CA SCREEN DOC REV: CPT | Performed by: INTERNAL MEDICINE

## 2021-03-12 PROCEDURE — 94760 N-INVAS EAR/PLS OXIMETRY 1: CPT

## 2021-03-12 PROCEDURE — 71271 CT THORAX LUNG CANCER SCR C-: CPT

## 2021-03-12 PROCEDURE — G8482 FLU IMMUNIZE ORDER/ADMIN: HCPCS | Performed by: INTERNAL MEDICINE

## 2021-03-12 PROCEDURE — G8417 CALC BMI ABV UP PARAM F/U: HCPCS | Performed by: INTERNAL MEDICINE

## 2021-03-12 PROCEDURE — 94640 AIRWAY INHALATION TREATMENT: CPT

## 2021-03-12 PROCEDURE — 94060 EVALUATION OF WHEEZING: CPT

## 2021-03-12 PROCEDURE — 3023F SPIROM DOC REV: CPT | Performed by: INTERNAL MEDICINE

## 2021-03-12 PROCEDURE — G8427 DOCREV CUR MEDS BY ELIG CLIN: HCPCS | Performed by: INTERNAL MEDICINE

## 2021-03-12 PROCEDURE — 99214 OFFICE O/P EST MOD 30 MIN: CPT | Performed by: INTERNAL MEDICINE

## 2021-03-12 PROCEDURE — 1036F TOBACCO NON-USER: CPT | Performed by: INTERNAL MEDICINE

## 2021-03-12 PROCEDURE — 94729 DIFFUSING CAPACITY: CPT

## 2021-03-12 RX ORDER — ALBUTEROL SULFATE 90 UG/1
2 AEROSOL, METERED RESPIRATORY (INHALATION) ONCE
Status: COMPLETED | OUTPATIENT
Start: 2021-03-12 | End: 2021-03-12

## 2021-03-12 RX ADMIN — Medication 2 PUFF: at 10:25

## 2021-03-12 NOTE — PATIENT INSTRUCTIONS
Remember to bring all pulmonary medications to your next appointment with the office. Please keep all of your future appointments scheduled by [facility] office. Out of respect for other patients and providers, you may be asked to reschedule your appointment if you arrive later than your scheduled appointment time. Appointments cancelled less than 24hrs in advance will be considered a no show. Patients with three missed appointments within 1 year or four missed appointments within 2 years can be dismissed from the practice.

## 2021-03-12 NOTE — PROGRESS NOTES
Pulmonary Follow-up Note  Chief Complaint: COVID, hypoxemia   Referring Provider: hospital f/u    Interval history: doing better, only shortness of breath with shoveling or other demanding activities, stays above 88% at all times, walks as far as he wishes. Had PFT and LDCT today. Presenting history: 12/8/20 This is a 70-year-old male with a known history of COPD who was recently diagnosed with COVID-19. He has an 8-day history of fevers associated with chills. Subsequently developed moderately severe shortness of breath, worse with exertion, associated with hypoxemia with exertion, was 84% on room air with ambulation. The hypoxemia improved with supplemental oxygen. No similar prior episodes. reports that he quit smoking about 12 years ago. His smoking use included cigarettes. He has a 60.00 pack-year smoking history. He has never used smokeless tobacco.     Review of Systems:    Physical Exam:  Blood pressure 122/78, pulse 81, temperature 96.7 °F (35.9 °C), temperature source Temporal, resp. rate 18, height 5' 8\" (1.727 m), weight 165 lb (74.8 kg), SpO2 96 %. Constitutional:  No acute distress. HENT:  Oropharynx is clear and moist.   Neck: No tracheal deviation present. Cardiovascular: Normal heart sounds. No lower extremity edema. Pulmonary/Chest: No wheezes. No rhonchi. No rales. No decreased breath sounds. No accessory muscle usage or stridor. Musculoskeletal: No cyanosis. No clubbing. Skin: Skin is warm and dry. Psychiatric: Normal mood and affect. Neurologic: speech fluent, alert and oriented, strength symmetric         Cultures:   12/5/2020 SARS-CoV-2 positive  12/8/2020 blood no growth       Films:  LDCT 3/12/21  Mediastinum: Coronary artery calcifications are a marker of atherosclerosis. There are no enlarged thoracic lymph nodes.  Calcified granulomatous disease   is noted.       Lungs/pleura:  The tracheobronchial tree is patent. Daved Jubilee is no pneumothorax   or pleural

## 2021-03-13 NOTE — PROCEDURES
[address]                 [address] [address]                               PULMONARY FUNCTION    PATIENT NAME: Mike Levi                        :        1955  MED REC NO:   [id number]                          ROOM:  ACCOUNT NO:   [de-identified]                           ADMIT DATE: 2021  PROVIDER:     Marilynn Borrego MD    DATE OF PROCEDURE:  2021    INDICATION:  COPD. FINDINGS:  1. Spirometry: The FEV1 is 1.42 L or 45% predicted and improves to 52%  of predicted with inhaled bronchodilators. 2.  Lung volumes: Total lung capacity is elevated. There is air  trapping present. 3.  Diffusion capacity:  DLCO is 11.58 mL/min/mmHg, which is 40%  predicted. 4.  Flow volume loop shows an obstructive pattern. IMPRESSION:  Moderately severe obstructive lung defect along with air  trapping and a moderate to severe reduction in diffusion capacity, all  consistent with COPD.         Renato Melissa MD    D: 2021 15:52:55       T: 2021 21:24:33     [email or web address]  Job#: [id number]     Doc#: [id number]    CC:

## 2021-09-28 ENCOUNTER — TELEPHONE (OUTPATIENT)
Dept: PULMONOLOGY | Age: 66
End: 2021-09-28

## 2021-09-28 NOTE — TELEPHONE ENCOUNTER
Patient did not show for 6 month CT follow-up appointment  with Aung Guevara on 9/28/21. Patient did not complete CT either.     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

## 2021-10-04 ENCOUNTER — TELEPHONE (OUTPATIENT)
Dept: CASE MANAGEMENT | Age: 66
End: 2021-10-04

## 2021-10-04 NOTE — TELEPHONE ENCOUNTER
Patient due for F/U imaging to annual CT Lung Screening 3/12/2021, LRAD3 . Reminder letter mailed. Office is trying to contact as well.     Karoline Fitzpatrick [address] Lung Navigator  [phone]

## 2021-10-13 ENCOUNTER — HOSPITAL ENCOUNTER (OUTPATIENT)
Dept: CT IMAGING | Age: 66
Discharge: HOME | End: 2021-10-13
Payer: MEDICARE

## 2021-10-13 DIAGNOSIS — R91.1 PULMONARY NODULE: ICD-10-CM

## 2021-10-13 PROCEDURE — 71250 CT THORAX DX C-: CPT

## 2021-10-13 NOTE — RESULT ENCOUNTER NOTE
tell pt there are no new concerning findings on his CT. He can set up his f/u screen for 2022 when he sees me in office.

## 2022-01-18 ENCOUNTER — TELEPHONE (OUTPATIENT)
Dept: PULMONOLOGY | Age: 67
End: 2022-01-18

## 2022-01-18 NOTE — LETTER
[facility] Pulmonary, Critical Care, and Sleep  [address], [address] [place] [address]  Phone: [phone]  Fax: [phone]      January 28, 2022    Connie Millard  [address] [address]    Dear Dinorah Stevenson:    I am writing you because I have been informed of your missed appointment which was scheduled for 1/18/22. Our office has called 3 times to reschedule that appointment & has not heard back. We ask that you please call 1 business day in advance if you are unable to make your appointment so that we can give that time to another patient in need. We care about you and the management of your healthcare and want to make sure that you follow up as recommended. As your Pulmonologist I must stress to you how important it is for you to have the tests I order as well to see me in follow up. The tests are necessary so that I can monitor your pulmonary nodule for any changes that could be cancer. I have to also mention, that in an effort to provide quality care and timely appointments to all my patients, it is our policy that patients be discharged from the practice if they do not show for three scheduled appointments. You would be informed of this termination by mail, and would have 30 days from the date of discharge to locate another physician. We're sorry you were unable to keep your appointment and hope that you are doing well. We would like to continue treating your healthcare needs. Please call the office to let us know your plans for treatment and to reschedule your appointment.      Sincerely,        Stefania Lange MD       Sent by regular US Mail and  Certified Return Receipt Requested

## 2022-01-18 NOTE — TELEPHONE ENCOUNTER
Patient did not show for 6 mo ct fu appointment  with Dr. 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

## 2022-01-28 NOTE — TELEPHONE ENCOUNTER
Pulmonary nodule letter drafted and printed for MD's sig. Letter to be sent certified and regular mail once signed by Dr. Aki Blackwood.

## 2022-09-28 ENCOUNTER — TELEPHONE (OUTPATIENT)
Dept: CASE MANAGEMENT | Age: 67
End: 2022-09-28

## 2022-09-28 NOTE — TELEPHONE ENCOUNTER
Patient due for annual CT Lung Screening. Reminder letter mailed.     Karoline Fitzpatrick [address] Lung Navigator  [phone]

## 2022-11-02 ENCOUNTER — TELEPHONE (OUTPATIENT)
Dept: CASE MANAGEMENT | Age: 67
End: 2022-11-02

## 2022-11-02 NOTE — TELEPHONE ENCOUNTER
Patient due for annual CT Lung Screening. Reminder letter mailed.     Karoline Fitzpatrick [address] Lung Navigator  [phone]

## 2022-11-13 ENCOUNTER — TELEPHONE (OUTPATIENT)
Dept: CASE MANAGEMENT | Age: 67
End: 2022-11-13

## 2022-11-13 NOTE — TELEPHONE ENCOUNTER
Patient overdue for annual CT Lung Screening. Final reminder letter mailed.     Karoline Arellano Lung Navigator  [phone]